# Patient Record
Sex: FEMALE | Race: WHITE | NOT HISPANIC OR LATINO | Employment: OTHER | URBAN - METROPOLITAN AREA
[De-identification: names, ages, dates, MRNs, and addresses within clinical notes are randomized per-mention and may not be internally consistent; named-entity substitution may affect disease eponyms.]

---

## 2019-05-24 DIAGNOSIS — M25.571 RIGHT ANKLE PAIN, UNSPECIFIED CHRONICITY: Primary | ICD-10-CM

## 2019-05-28 ENCOUNTER — HOSPITAL ENCOUNTER (OUTPATIENT)
Dept: RADIOLOGY | Facility: HOSPITAL | Age: 49
Discharge: HOME | End: 2019-05-28
Attending: ORTHOPAEDIC SURGERY
Payer: COMMERCIAL

## 2019-05-28 ENCOUNTER — OFFICE VISIT (OUTPATIENT)
Dept: ORTHOPEDICS | Facility: CLINIC | Age: 49
End: 2019-05-28
Payer: COMMERCIAL

## 2019-05-28 ENCOUNTER — TELEPHONE (OUTPATIENT)
Dept: ORTHOPEDICS | Facility: CLINIC | Age: 49
End: 2019-05-28

## 2019-05-28 VITALS
DIASTOLIC BLOOD PRESSURE: 81 MMHG | SYSTOLIC BLOOD PRESSURE: 129 MMHG | HEIGHT: 66 IN | BODY MASS INDEX: 20.25 KG/M2 | HEART RATE: 65 BPM | WEIGHT: 126 LBS

## 2019-05-28 DIAGNOSIS — M25.571 RIGHT ANKLE PAIN, UNSPECIFIED CHRONICITY: Primary | ICD-10-CM

## 2019-05-28 DIAGNOSIS — M79.671 RIGHT FOOT PAIN: ICD-10-CM

## 2019-05-28 DIAGNOSIS — M25.571 RIGHT ANKLE PAIN, UNSPECIFIED CHRONICITY: ICD-10-CM

## 2019-05-28 DIAGNOSIS — M21.41 ACQUIRED PES PLANOVALGUS OF RIGHT FOOT: ICD-10-CM

## 2019-05-28 PROCEDURE — 99204 OFFICE O/P NEW MOD 45 MIN: CPT | Mod: S$GLB,,, | Performed by: ORTHOPAEDIC SURGERY

## 2019-05-28 PROCEDURE — 3008F BODY MASS INDEX DOCD: CPT | Mod: CPTII,S$GLB,, | Performed by: ORTHOPAEDIC SURGERY

## 2019-05-28 PROCEDURE — 73610 X-RAY EXAM OF ANKLE: CPT | Mod: 26,RT,, | Performed by: RADIOLOGY

## 2019-05-28 PROCEDURE — 99999 PR PBB SHADOW E&M-EST. PATIENT-LVL III: CPT | Mod: PBBFAC,,, | Performed by: ORTHOPAEDIC SURGERY

## 2019-05-28 PROCEDURE — 73610 X-RAY EXAM OF ANKLE: CPT | Mod: TC,PO,RT

## 2019-05-28 PROCEDURE — 3008F PR BODY MASS INDEX (BMI) DOCUMENTED: ICD-10-PCS | Mod: CPTII,S$GLB,, | Performed by: ORTHOPAEDIC SURGERY

## 2019-05-28 PROCEDURE — 99204 PR OFFICE/OUTPT VISIT, NEW, LEVL IV, 45-59 MIN: ICD-10-PCS | Mod: S$GLB,,, | Performed by: ORTHOPAEDIC SURGERY

## 2019-05-28 PROCEDURE — 99999 PR PBB SHADOW E&M-EST. PATIENT-LVL III: ICD-10-PCS | Mod: PBBFAC,,, | Performed by: ORTHOPAEDIC SURGERY

## 2019-05-28 PROCEDURE — 73610 XR ANKLE COMPLETE 3 VIEW RIGHT: ICD-10-PCS | Mod: 26,RT,, | Performed by: RADIOLOGY

## 2019-05-28 RX ORDER — NORGESTREL AND ETHINYL ESTRADIOL 0.3-0.03MG
KIT ORAL
Refills: 1 | COMMUNITY
Start: 2019-04-08 | End: 2023-06-08

## 2019-05-28 RX ORDER — PRAVASTATIN SODIUM 20 MG/1
20 TABLET ORAL NIGHTLY
Refills: 5 | COMMUNITY
Start: 2019-05-15

## 2019-05-28 NOTE — TELEPHONE ENCOUNTER
----- Message from Kaylin Lynch MA sent at 5/28/2019  2:59 PM CDT -----  Contact: pt   Insurance [facility] [place]   Please fax to that office   Call back

## 2019-05-28 NOTE — TELEPHONE ENCOUNTER
----- Message from Xavi Esquivel sent at 5/28/2019  4:42 PM CDT -----  Contact: Patient  [facility] please call again  [facility]

## 2019-05-28 NOTE — PROGRESS NOTES
"HPI: Lisa Simmons is a 48 y.o. female who complains of chronic right ankle pain. She says the pain began 20 years ago when a horse fell on her while she was riding. She wore a cast for 6 weeks at that time.  She still teaches horse back riding. She rates her pain as 3/10 today. She says the pain gets worse every year. She has tried bracing, NSAIDs, and orthotics without relief. The pain is now radiating up the leg.     PAST MEDICAL/SURGICAL/FAMILY/SOCIAL/ HISTORY: REVIEWED    ALLERGIES/MEDICATIONS: REVIEWED       Review of Systems:     Constitution: Negative.   HEENT: Negative.   Eyes: Negative.   Cardiovascular: Negative.   Respiratory: Negative.   Endocrine: Negative.   Hematologic/Lymphatic: Negative.   Skin: Negative.   Musculoskeletal: Positive for right ankle pain   Gastrointestinal: Negative.   Genitourinary: Negative.   Neurological: Negative.   Psychiatric/Behavioral: Negative.   Allergic/Immunologic: Negative.       PHYSICAL EXAM:  Vitals:    05/28/19 1033   BP: 129/81   Pulse: 65     Ht Readings from Last 1 Encounters:   05/28/19 5' 6" (1.676 m)     Wt Readings from Last 1 Encounters:   05/28/19 57.2 kg (126 lb)         GENERAL: Well developed, well nourished, no acute distress. Very pleasant.   SKIN: Skin is intact. No atrophy, abrasions or lesions are noted.   Neurological: Normal mental status. Appropriate and conversant. Alert and oriented x 3.  GAIT: Walks with an -antalgic gait.    Right lower extremity compared with LLE:  2+ dorsalis pedis pulse.  Capillary refill < 3 seconds.  Decreased range of motion tibiotalar and subtalar joints. Severe pes planovalgus and forefoot abductus. THis is still a flexible deformity.  5/5 strength EHL, FHL, tibialis anterior, gastrocsoleus, tibialis posterior and peroneals. Sensation to light touch intact sural, saphenous, superficial peroneal and deep peroneal nerves. No swelling or ecchymosis. No lymphadenopathy, no masses or tumors palpated.   " tenderness to palpation along posterior tibial tendon proximally. She is able to perform single heel raise.       XRAYS:   3 views of right ankle obtained and reviewed today reveal pes planus.       ASSESSMENT:      Right pes planovalgus acquired.       PLAN:  I spent 25 minutes in consulation with the patient today. More than half the time was spent counseling the patient on her condition and the options for operative versus non-operative care.  She has failed conservative treatment. I ordered an MRI of the right hindfoot and midfoot to evaluate for posterior tibial tendon tear and spring ligament tear. F/u post MRI to discuss treatment options.

## 2019-05-28 NOTE — TELEPHONE ENCOUNTER
Lm for patient advising that [facility] is not as great as [facility]. Mri has been approved for [facility]. Patient to call back.

## 2019-05-29 DIAGNOSIS — M79.671 RIGHT FOOT PAIN: Primary | ICD-10-CM

## 2019-05-29 DIAGNOSIS — M21.41 ACQUIRED PES PLANOVALGUS OF RIGHT FOOT: ICD-10-CM

## 2019-06-06 ENCOUNTER — TELEPHONE (OUTPATIENT)
Dept: ORTHOPEDICS | Facility: CLINIC | Age: 49
End: 2019-06-06

## 2019-06-06 NOTE — TELEPHONE ENCOUNTER
----- Message from Xavi Esquivel sent at 6/6/2019 11:30 AM CDT -----  Contact: Brady /   Patient called about Xray, Please call at

## 2019-06-13 ENCOUNTER — OFFICE VISIT (OUTPATIENT)
Dept: ORTHOPEDICS | Facility: CLINIC | Age: 49
End: 2019-06-13
Payer: COMMERCIAL

## 2019-06-13 ENCOUNTER — DOCUMENTATION ONLY (OUTPATIENT)
Dept: ORTHOPEDICS | Facility: CLINIC | Age: 49
End: 2019-06-13

## 2019-06-13 VITALS
DIASTOLIC BLOOD PRESSURE: 72 MMHG | HEART RATE: 55 BPM | HEIGHT: 66 IN | SYSTOLIC BLOOD PRESSURE: 109 MMHG | WEIGHT: 126 LBS | BODY MASS INDEX: 20.25 KG/M2

## 2019-06-13 DIAGNOSIS — M21.41 ACQUIRED PES PLANOVALGUS OF RIGHT FOOT: Primary | ICD-10-CM

## 2019-06-13 PROCEDURE — 3008F PR BODY MASS INDEX (BMI) DOCUMENTED: ICD-10-PCS | Mod: CPTII,S$GLB,, | Performed by: ORTHOPAEDIC SURGERY

## 2019-06-13 PROCEDURE — 99214 OFFICE O/P EST MOD 30 MIN: CPT | Mod: S$GLB,,, | Performed by: ORTHOPAEDIC SURGERY

## 2019-06-13 PROCEDURE — 99999 PR PBB SHADOW E&M-EST. PATIENT-LVL III: ICD-10-PCS | Mod: PBBFAC,,, | Performed by: ORTHOPAEDIC SURGERY

## 2019-06-13 PROCEDURE — 3008F BODY MASS INDEX DOCD: CPT | Mod: CPTII,S$GLB,, | Performed by: ORTHOPAEDIC SURGERY

## 2019-06-13 PROCEDURE — 99999 PR PBB SHADOW E&M-EST. PATIENT-LVL III: CPT | Mod: PBBFAC,,, | Performed by: ORTHOPAEDIC SURGERY

## 2019-06-13 PROCEDURE — 99214 PR OFFICE/OUTPT VISIT, EST, LEVL IV, 30-39 MIN: ICD-10-PCS | Mod: S$GLB,,, | Performed by: ORTHOPAEDIC SURGERY

## 2019-06-13 NOTE — PROGRESS NOTES
"HPI: Lisa Simmons is a 48 y.o. female who is here for f/u on her MRI of her  right ankle and midfoot. She says the pain began 20 years ago when a horse fell on her while she was riding. She wore a cast for 6 weeks at that time.  She still teaches horse back riding. She rates her pain as 4/10 today. She says the pain gets worse every year. She has tried bracing, NSAIDs, and orthotics without relief. The pain is now radiating up the leg. The pain has been worsening over time.     PAST MEDICAL/SURGICAL/FAMILY/SOCIAL/ HISTORY: REVIEWED    ALLERGIES/MEDICATIONS: REVIEWED       Review of Systems:     Constitution: Negative.   HEENT: Negative.   Eyes: Negative.   Cardiovascular: Negative.   Respiratory: Negative.   Endocrine: Negative.   Hematologic/Lymphatic: Negative.   Skin: Negative.   Musculoskeletal: Positive for right ankle pain   Gastrointestinal: Negative.   Genitourinary: Negative.   Neurological: Negative.   Psychiatric/Behavioral: Negative.   Allergic/Immunologic: Negative.       PHYSICAL EXAM:  Vitals:    06/13/19 0942   BP: 109/72   Pulse: (!) 55     Ht Readings from Last 1 Encounters:   06/13/19 5' 6" (1.676 m)     Wt Readings from Last 1 Encounters:   06/13/19 57.2 kg (126 lb)         GENERAL: Well developed, well nourished, no acute distress. Very pleasant.   SKIN: Skin is intact. No atrophy, abrasions or lesions are noted.   Neurological: Normal mental status. Appropriate and conversant. Alert and oriented x 3.  GAIT: Walks with an -antalgic gait.    Right lower extremity compared with LLE:  2+ dorsalis pedis pulse.  Capillary refill < 3 seconds.  Decreased range of motion tibiotalar and subtalar joints. Severe pes planovalgus and forefoot abductus. THis is still a flexible deformity.  5/5 strength EHL, FHL, tibialis anterior, gastrocsoleus, tibialis posterior and peroneals. Sensation to light touch intact sural, saphenous, superficial peroneal and deep peroneal nerves. No swelling or " ecchymosis. No lymphadenopathy, no masses or tumors palpated.   tenderness to palpation along posterior tibial tendon proximally. She is able to perform single heel raise.   non-tender to palpation at the 1st tmtj.       XRAYS:   3 views of right ankle obtained and reviewed today reveal pes planus.       ASSESSMENT:      Right pes planovalgus acquired.       PLAN:  I spent 15 minutes in consulation with the patient again today. I reviewed her MRI of the right hindfoot and midfoot which showed no evidence of  posterior tibial tendon tear or spring ligament tear. She has now failed conservative management with nsaids, activity modification, PT, orthotics, and bracing.  We discussed flat foot reconstruction with gastroc recession, amezquita, cotton and medial sliding calcaneal osteotomy. We discussed surgical treatment for the patient's condition at length and in detail including post-operative course, complications, outcomes and prognosis using diagrams and models where appropriate. Informed consent was obtained, patient understands risks and benefits of procedure.  To OR 6/26/19.

## 2019-06-13 NOTE — PROGRESS NOTES
Instructed patient on preop instructions. Patient to stop taking any blood thinning medications at least seven days prior to scheduled surgery. Patient to not take any NSAIDS at least 7 days prior to surgery and will resume when Dr. Guy instructs them to as it can delay bone healing. Nothing to eat/drink after midnight the night prior to surgery.   Patient instructed on postop care. Instructions included to remain non weight bearing until instructed otherwise by Dr. Guy. Post op dressing can not get wet. If it gets wet patient to call office immediately or go to [facility] ER to have it replaced. Patient to keep affected limb elevated higher than the heart at all times after surgery to decrease swelling. Further instructions given to patient on preop/postop instruction handout. No NSAID form given to patient. Patient choose knee scooter to be ordered. Instructed we will attempt to have it authorized through insurance. Handout on knee scooter given to patient as well. Surgery date of 6/26/19. Patient verbalized understanding.

## 2019-06-14 ENCOUNTER — TELEPHONE (OUTPATIENT)
Dept: ORTHOPEDICS | Facility: CLINIC | Age: 49
End: 2019-06-14

## 2019-06-14 DIAGNOSIS — M21.41 ACQUIRED PES PLANOVALGUS OF RIGHT FOOT: Primary | ICD-10-CM

## 2019-06-14 RX ORDER — SODIUM CHLORIDE 9 MG/ML
INJECTION, SOLUTION INTRAVENOUS CONTINUOUS
Status: CANCELLED | OUTPATIENT
Start: 2019-06-14

## 2019-06-14 NOTE — TELEPHONE ENCOUNTER
----- Message from Kaylin Lynch MA sent at 6/14/2019  9:28 AM CDT -----  Contact: binu phan on Right leg, schamburgs disease   Call back      Up coming surgery scheduled

## 2019-06-25 ENCOUNTER — ANESTHESIA EVENT (OUTPATIENT)
Dept: SURGERY | Facility: HOSPITAL | Age: 49
End: 2019-06-25
Payer: COMMERCIAL

## 2019-06-25 DIAGNOSIS — M21.41 ACQUIRED PES PLANOVALGUS OF RIGHT FOOT: ICD-10-CM

## 2019-06-25 DIAGNOSIS — M79.671 RIGHT FOOT PAIN: Primary | ICD-10-CM

## 2019-06-25 PROCEDURE — 99024 POSTOP FOLLOW-UP VISIT: CPT | Mod: ,,, | Performed by: ORTHOPAEDIC SURGERY

## 2019-06-25 PROCEDURE — 99024 SUTURE REMOVAL: ICD-10-PCS | Mod: ,,, | Performed by: ORTHOPAEDIC SURGERY

## 2019-06-26 ENCOUNTER — ANESTHESIA (OUTPATIENT)
Dept: SURGERY | Facility: HOSPITAL | Age: 49
End: 2019-06-26
Payer: COMMERCIAL

## 2019-06-26 ENCOUNTER — HOSPITAL ENCOUNTER (OUTPATIENT)
Dept: RADIOLOGY | Facility: HOSPITAL | Age: 49
Discharge: HOME | End: 2019-06-26
Attending: ORTHOPAEDIC SURGERY | Admitting: ORTHOPAEDIC SURGERY
Payer: COMMERCIAL

## 2019-06-26 ENCOUNTER — HOSPITAL ENCOUNTER (OUTPATIENT)
Facility: HOSPITAL | Age: 49
Discharge: HOME | End: 2019-06-26
Attending: ORTHOPAEDIC SURGERY | Admitting: ORTHOPAEDIC SURGERY
Payer: COMMERCIAL

## 2019-06-26 VITALS
BODY MASS INDEX: 20.89 KG/M2 | HEART RATE: 70 BPM | WEIGHT: 130 LBS | DIASTOLIC BLOOD PRESSURE: 72 MMHG | RESPIRATION RATE: 16 BRPM | HEIGHT: 66 IN | SYSTOLIC BLOOD PRESSURE: 124 MMHG | OXYGEN SATURATION: 100 % | TEMPERATURE: 97 F

## 2019-06-26 DIAGNOSIS — M79.671 RIGHT FOOT PAIN: ICD-10-CM

## 2019-06-26 DIAGNOSIS — M21.41 ACQUIRED PES PLANOVALGUS OF RIGHT FOOT: Primary | ICD-10-CM

## 2019-06-26 LAB
B-HCG UR QL: NEGATIVE
CTP QC/QA: YES

## 2019-06-26 PROCEDURE — 27200750 HC INSULATED NEEDLE/ STIMUPLEX: Mod: PO | Performed by: ANESTHESIOLOGY

## 2019-06-26 PROCEDURE — 28304 INCISION OF MIDFOOT BONES: CPT | Mod: 51,RT,, | Performed by: ORTHOPAEDIC SURGERY

## 2019-06-26 PROCEDURE — 71000015 HC POSTOP RECOV 1ST HR: Mod: PO | Performed by: ORTHOPAEDIC SURGERY

## 2019-06-26 PROCEDURE — 71000033 HC RECOVERY, INTIAL HOUR: Mod: PO | Performed by: ORTHOPAEDIC SURGERY

## 2019-06-26 PROCEDURE — 76000 FLUOROSCOPY <1 HR PHYS/QHP: CPT | Mod: TC,PO

## 2019-06-26 PROCEDURE — 27687 REVISION OF CALF TENDON: CPT | Mod: 51,RT,, | Performed by: ORTHOPAEDIC SURGERY

## 2019-06-26 PROCEDURE — 76942 ECHO GUIDE FOR BIOPSY: CPT | Mod: 26,,, | Performed by: ANESTHESIOLOGY

## 2019-06-26 PROCEDURE — 64450 NJX AA&/STRD OTHER PN/BRANCH: CPT | Mod: 59,RT,, | Performed by: ANESTHESIOLOGY

## 2019-06-26 PROCEDURE — 64445 NJX AA&/STRD SCIATIC NRV IMG: CPT | Mod: PO | Performed by: ANESTHESIOLOGY

## 2019-06-26 PROCEDURE — 27800903 OPTIME MED/SURG SUP & DEVICES OTHER IMPLANTS: Mod: PO | Performed by: ORTHOPAEDIC SURGERY

## 2019-06-26 PROCEDURE — 63600175 PHARM REV CODE 636 W HCPCS: Mod: PO | Performed by: NURSE ANESTHETIST, CERTIFIED REGISTERED

## 2019-06-26 PROCEDURE — 81025 URINE PREGNANCY TEST: CPT | Mod: PO | Performed by: ORTHOPAEDIC SURGERY

## 2019-06-26 PROCEDURE — 63600175 PHARM REV CODE 636 W HCPCS: Mod: PO | Performed by: ANESTHESIOLOGY

## 2019-06-26 PROCEDURE — D9220A PRA ANESTHESIA: ICD-10-PCS | Mod: CRNA,,, | Performed by: NURSE ANESTHETIST, CERTIFIED REGISTERED

## 2019-06-26 PROCEDURE — 25000003 PHARM REV CODE 250: Mod: PO | Performed by: ANESTHESIOLOGY

## 2019-06-26 PROCEDURE — 36000709 HC OR TIME LEV III EA ADD 15 MIN: Mod: PO | Performed by: ORTHOPAEDIC SURGERY

## 2019-06-26 PROCEDURE — 27201423 OPTIME MED/SURG SUP & DEVICES STERILE SUPPLY: Mod: PO | Performed by: ORTHOPAEDIC SURGERY

## 2019-06-26 PROCEDURE — 28300 INCISION OF HEEL BONE: CPT | Mod: RT,,, | Performed by: ORTHOPAEDIC SURGERY

## 2019-06-26 PROCEDURE — 64450 PR NERVE BLOCK INJ, ANES/STEROID, OTHER PERIPHERAL: ICD-10-PCS | Mod: 59,RT,, | Performed by: ANESTHESIOLOGY

## 2019-06-26 PROCEDURE — 25000003 PHARM REV CODE 250: Mod: PO | Performed by: ORTHOPAEDIC SURGERY

## 2019-06-26 PROCEDURE — C1713 ANCHOR/SCREW BN/BN,TIS/BN: HCPCS | Mod: PO | Performed by: ORTHOPAEDIC SURGERY

## 2019-06-26 PROCEDURE — 27687 PR GASTROCNEMIUS RECESSION: ICD-10-PCS | Mod: 51,RT,, | Performed by: ORTHOPAEDIC SURGERY

## 2019-06-26 PROCEDURE — S0020 INJECTION, BUPIVICAINE HYDRO: HCPCS | Mod: PO | Performed by: ANESTHESIOLOGY

## 2019-06-26 PROCEDURE — 63600175 PHARM REV CODE 636 W HCPCS: Mod: PO | Performed by: ORTHOPAEDIC SURGERY

## 2019-06-26 PROCEDURE — D9220A PRA ANESTHESIA: ICD-10-PCS | Mod: ANES,,, | Performed by: ANESTHESIOLOGY

## 2019-06-26 PROCEDURE — 28304 PR OSTEOTOMY MIDTARSAL BONES: ICD-10-PCS | Mod: 51,RT,, | Performed by: ORTHOPAEDIC SURGERY

## 2019-06-26 PROCEDURE — 25000003 PHARM REV CODE 250: Mod: PO | Performed by: NURSE ANESTHETIST, CERTIFIED REGISTERED

## 2019-06-26 PROCEDURE — 76942 PR U/S GUIDANCE FOR NEEDLE GUIDANCE: ICD-10-PCS | Mod: 26,,, | Performed by: ANESTHESIOLOGY

## 2019-06-26 PROCEDURE — C1769 GUIDE WIRE: HCPCS | Mod: PO | Performed by: ORTHOPAEDIC SURGERY

## 2019-06-26 PROCEDURE — 76942 ECHO GUIDE FOR BIOPSY: CPT | Mod: PO | Performed by: ANESTHESIOLOGY

## 2019-06-26 PROCEDURE — C9290 INJ, BUPIVACAINE LIPOSOME: HCPCS | Mod: PO | Performed by: ANESTHESIOLOGY

## 2019-06-26 PROCEDURE — 28300 PR OSTEOTOMY HEEL BONE: ICD-10-PCS | Mod: RT,,, | Performed by: ORTHOPAEDIC SURGERY

## 2019-06-26 PROCEDURE — D9220A PRA ANESTHESIA: Mod: CRNA,,, | Performed by: NURSE ANESTHETIST, CERTIFIED REGISTERED

## 2019-06-26 PROCEDURE — 36000708 HC OR TIME LEV III 1ST 15 MIN: Mod: PO | Performed by: ORTHOPAEDIC SURGERY

## 2019-06-26 PROCEDURE — D9220A PRA ANESTHESIA: Mod: ANES,,, | Performed by: ANESTHESIOLOGY

## 2019-06-26 PROCEDURE — 37000009 HC ANESTHESIA EA ADD 15 MINS: Mod: PO | Performed by: ORTHOPAEDIC SURGERY

## 2019-06-26 PROCEDURE — 37000008 HC ANESTHESIA 1ST 15 MINUTES: Mod: PO | Performed by: ORTHOPAEDIC SURGERY

## 2019-06-26 DEVICE — IMPLANTABLE DEVICE: Type: IMPLANTABLE DEVICE | Site: FOOT | Status: FUNCTIONAL

## 2019-06-26 RX ORDER — GLYCOPYRROLATE 0.2 MG/ML
INJECTION INTRAMUSCULAR; INTRAVENOUS
Status: DISCONTINUED | OUTPATIENT
Start: 2019-06-26 | End: 2019-06-26

## 2019-06-26 RX ORDER — ONDANSETRON 2 MG/ML
INJECTION INTRAMUSCULAR; INTRAVENOUS
Status: DISCONTINUED | OUTPATIENT
Start: 2019-06-26 | End: 2019-06-26

## 2019-06-26 RX ORDER — MAGNESIUM 30 MG
TABLET ORAL ONCE
COMMUNITY

## 2019-06-26 RX ORDER — MIDAZOLAM HYDROCHLORIDE 1 MG/ML
INJECTION, SOLUTION INTRAMUSCULAR; INTRAVENOUS
Status: DISCONTINUED | OUTPATIENT
Start: 2019-06-26 | End: 2019-06-26

## 2019-06-26 RX ORDER — PROPOFOL 10 MG/ML
VIAL (ML) INTRAVENOUS
Status: DISCONTINUED | OUTPATIENT
Start: 2019-06-26 | End: 2019-06-26

## 2019-06-26 RX ORDER — BUPIVACAINE HYDROCHLORIDE 5 MG/ML
INJECTION, SOLUTION EPIDURAL; INTRACAUDAL
Status: COMPLETED | OUTPATIENT
Start: 2019-06-26 | End: 2019-06-26

## 2019-06-26 RX ORDER — MUPIROCIN 20 MG/G
OINTMENT TOPICAL
Status: DISCONTINUED | OUTPATIENT
Start: 2019-06-26 | End: 2019-06-26 | Stop reason: HOSPADM

## 2019-06-26 RX ORDER — FENTANYL CITRATE 50 UG/ML
INJECTION, SOLUTION INTRAMUSCULAR; INTRAVENOUS
Status: DISCONTINUED | OUTPATIENT
Start: 2019-06-26 | End: 2019-06-26

## 2019-06-26 RX ORDER — LIDOCAINE HCL/PF 100 MG/5ML
SYRINGE (ML) INTRAVENOUS
Status: DISCONTINUED | OUTPATIENT
Start: 2019-06-26 | End: 2019-06-26

## 2019-06-26 RX ORDER — MULTIVIT WITH MINERALS/HERBS
1 TABLET ORAL DAILY
COMMUNITY

## 2019-06-26 RX ORDER — DEXAMETHASONE SODIUM PHOSPHATE 4 MG/ML
8 INJECTION, SOLUTION INTRA-ARTICULAR; INTRALESIONAL; INTRAMUSCULAR; INTRAVENOUS; SOFT TISSUE
Status: COMPLETED | OUTPATIENT
Start: 2019-06-26 | End: 2019-06-26

## 2019-06-26 RX ORDER — SCOLOPAMINE TRANSDERMAL SYSTEM 1 MG/1
1 PATCH, EXTENDED RELEASE TRANSDERMAL
Status: DISCONTINUED | OUTPATIENT
Start: 2019-06-26 | End: 2019-06-26 | Stop reason: HOSPADM

## 2019-06-26 RX ORDER — OXYCODONE AND ACETAMINOPHEN 10; 325 MG/1; MG/1
1 TABLET ORAL EVERY 4 HOURS PRN
Qty: 22 TABLET | Refills: 0 | Status: SHIPPED | OUTPATIENT
Start: 2019-06-26 | End: 2019-06-27

## 2019-06-26 RX ORDER — MIDAZOLAM HYDROCHLORIDE 1 MG/ML
0.5 INJECTION INTRAMUSCULAR; INTRAVENOUS
Status: DISCONTINUED | OUTPATIENT
Start: 2019-06-26 | End: 2019-06-26 | Stop reason: HOSPADM

## 2019-06-26 RX ORDER — EPHEDRINE SULFATE 50 MG/ML
INJECTION, SOLUTION INTRAVENOUS
Status: DISCONTINUED | OUTPATIENT
Start: 2019-06-26 | End: 2019-06-26

## 2019-06-26 RX ORDER — LIDOCAINE HYDROCHLORIDE 10 MG/ML
1 INJECTION, SOLUTION EPIDURAL; INFILTRATION; INTRACAUDAL; PERINEURAL ONCE
Status: DISCONTINUED | OUTPATIENT
Start: 2019-06-26 | End: 2019-06-26 | Stop reason: HOSPADM

## 2019-06-26 RX ORDER — OXYCODONE HYDROCHLORIDE 5 MG/1
5 TABLET ORAL
Status: DISCONTINUED | OUTPATIENT
Start: 2019-06-26 | End: 2019-06-26 | Stop reason: HOSPADM

## 2019-06-26 RX ORDER — NEOSTIGMINE METHYLSULFATE 1 MG/ML
INJECTION, SOLUTION INTRAVENOUS
Status: DISCONTINUED | OUTPATIENT
Start: 2019-06-26 | End: 2019-06-26

## 2019-06-26 RX ORDER — ROCURONIUM BROMIDE 10 MG/ML
INJECTION, SOLUTION INTRAVENOUS
Status: DISCONTINUED | OUTPATIENT
Start: 2019-06-26 | End: 2019-06-26

## 2019-06-26 RX ORDER — SODIUM CHLORIDE, SODIUM LACTATE, POTASSIUM CHLORIDE, CALCIUM CHLORIDE 600; 310; 30; 20 MG/100ML; MG/100ML; MG/100ML; MG/100ML
INJECTION, SOLUTION INTRAVENOUS CONTINUOUS
Status: DISCONTINUED | OUTPATIENT
Start: 2019-06-26 | End: 2019-06-26 | Stop reason: HOSPADM

## 2019-06-26 RX ORDER — ZINC GLUCONATE 50 MG
50 TABLET ORAL DAILY
COMMUNITY

## 2019-06-26 RX ORDER — ASCORBIC ACID 250 MG
TABLET,CHEWABLE ORAL
COMMUNITY

## 2019-06-26 RX ORDER — CEFAZOLIN SODIUM 2 G/50ML
2 SOLUTION INTRAVENOUS
Status: COMPLETED | OUTPATIENT
Start: 2019-06-26 | End: 2019-06-26

## 2019-06-26 RX ORDER — FENTANYL CITRATE 50 UG/ML
25 INJECTION, SOLUTION INTRAMUSCULAR; INTRAVENOUS EVERY 5 MIN PRN
Status: DISCONTINUED | OUTPATIENT
Start: 2019-06-26 | End: 2019-06-26 | Stop reason: HOSPADM

## 2019-06-26 RX ORDER — HYDROMORPHONE HYDROCHLORIDE 2 MG/ML
0.2 INJECTION, SOLUTION INTRAMUSCULAR; INTRAVENOUS; SUBCUTANEOUS EVERY 5 MIN PRN
Status: DISCONTINUED | OUTPATIENT
Start: 2019-06-26 | End: 2019-06-26 | Stop reason: HOSPADM

## 2019-06-26 RX ORDER — ONDANSETRON 4 MG/1
8 TABLET, ORALLY DISINTEGRATING ORAL EVERY 8 HOURS PRN
Qty: 12 TABLET | Refills: 1 | Status: SHIPPED | OUTPATIENT
Start: 2019-06-26 | End: 2023-06-08

## 2019-06-26 RX ORDER — FERROUS SULFATE 325(65) MG
325 TABLET ORAL
COMMUNITY

## 2019-06-26 RX ORDER — SODIUM CHLORIDE 9 MG/ML
INJECTION, SOLUTION INTRAVENOUS CONTINUOUS
Status: DISCONTINUED | OUTPATIENT
Start: 2019-06-26 | End: 2019-06-26 | Stop reason: HOSPADM

## 2019-06-26 RX ADMIN — NEOSTIGMINE METHYLSULFATE 3 MG: 1 INJECTION INTRAVENOUS at 10:06

## 2019-06-26 RX ADMIN — LIDOCAINE HYDROCHLORIDE 100 MG: 20 INJECTION PARENTERAL at 09:06

## 2019-06-26 RX ADMIN — CEFAZOLIN SODIUM 2 G: 2 SOLUTION INTRAVENOUS at 09:06

## 2019-06-26 RX ADMIN — GLYCOPYRROLATE 0.4 MG: 0.2 INJECTION, SOLUTION INTRAMUSCULAR; INTRAVENOUS at 10:06

## 2019-06-26 RX ADMIN — MIDAZOLAM HYDROCHLORIDE 2 MG: 1 INJECTION, SOLUTION INTRAMUSCULAR; INTRAVENOUS at 09:06

## 2019-06-26 RX ADMIN — ROCURONIUM BROMIDE 30 MG: 10 INJECTION, SOLUTION INTRAVENOUS at 09:06

## 2019-06-26 RX ADMIN — SCOPALAMINE 1 PATCH: 1 PATCH, EXTENDED RELEASE TRANSDERMAL at 08:06

## 2019-06-26 RX ADMIN — BUPIVACAINE HYDROCHLORIDE 10 ML: 5 INJECTION, SOLUTION EPIDURAL; INTRACAUDAL; PERINEURAL at 09:06

## 2019-06-26 RX ADMIN — GLYCOPYRROLATE 0.2 MG: 0.2 INJECTION, SOLUTION INTRAMUSCULAR; INTRAVENOUS at 10:06

## 2019-06-26 RX ADMIN — SODIUM CHLORIDE, SODIUM LACTATE, POTASSIUM CHLORIDE, AND CALCIUM CHLORIDE: .6; .31; .03; .02 INJECTION, SOLUTION INTRAVENOUS at 10:06

## 2019-06-26 RX ADMIN — PROPOFOL 200 MG: 10 INJECTION, EMULSION INTRAVENOUS at 09:06

## 2019-06-26 RX ADMIN — FENTANYL CITRATE 50 MCG: 50 INJECTION INTRAMUSCULAR; INTRAVENOUS at 09:06

## 2019-06-26 RX ADMIN — DEXAMETHASONE SODIUM PHOSPHATE 8 MG: 4 INJECTION, SOLUTION INTRAMUSCULAR; INTRAVENOUS at 08:06

## 2019-06-26 RX ADMIN — FENTANYL CITRATE 50 MCG: 50 INJECTION, SOLUTION INTRAMUSCULAR; INTRAVENOUS at 09:06

## 2019-06-26 RX ADMIN — ONDANSETRON 4 MG: 2 INJECTION, SOLUTION INTRAMUSCULAR; INTRAVENOUS at 10:06

## 2019-06-26 RX ADMIN — FENTANYL CITRATE 50 MCG: 50 INJECTION, SOLUTION INTRAMUSCULAR; INTRAVENOUS at 10:06

## 2019-06-26 RX ADMIN — SODIUM CHLORIDE, SODIUM LACTATE, POTASSIUM CHLORIDE, AND CALCIUM CHLORIDE: .6; .31; .03; .02 INJECTION, SOLUTION INTRAVENOUS at 08:06

## 2019-06-26 RX ADMIN — BUPIVACAINE 20 ML: 13.3 INJECTION, SUSPENSION, LIPOSOMAL INFILTRATION at 09:06

## 2019-06-26 RX ADMIN — EPHEDRINE SULFATE 10 MG: 50 INJECTION, SOLUTION INTRAMUSCULAR; INTRAVENOUS; SUBCUTANEOUS at 10:06

## 2019-06-26 NOTE — ANESTHESIA PROCEDURE NOTES
Peripheral Block    Patient location during procedure: pre-op   Block not for primary anesthetic.  Reason for block: at surgeon's request and post-op pain management   Post-op Pain Location: right ankle  Start time: 6/26/2019 9:04 AM  Timeout: 6/26/2019 9:03 AM   End time: 6/26/2019 9:13 AM  Staffing  Other anesthesia staff: Raquel Lara MD  Preanesthetic Checklist  Completed: patient identified, site marked, surgical consent, pre-op evaluation, timeout performed, IV checked, risks and benefits discussed and monitors and equipment checked  Peripheral Block  Patient position: supine  Prep: ChloraPrep  Patient monitoring: heart rate, cardiac monitor, continuous pulse ox, continuous capnometry and frequent blood pressure checks  Block type: popliteal  Laterality: right  Injection technique: single shot  Needle  Needle type: Stimuplex   Needle gauge: 21 G  Needle length: 4 in  Needle localization: anatomical landmarks and ultrasound guidance   -ultrasound image captured on disc.  Assessment  Injection assessment: negative aspiration, negative parasthesia and local visualized surrounding nerve  Paresthesia pain: none  Heart rate change: no  Slow fractionated injection: yes  Additional Notes  VSS.  [facility] RN monitoring vitals throughout procedure.  Patient tolerated procedure well.     Exparel 20mL

## 2019-06-26 NOTE — ANESTHESIA PREPROCEDURE EVALUATION
06/26/2019  Lisa Simmons is a 48 y.o., female.    Anesthesia Evaluation    I have reviewed the Patient Summary Reports.    I have reviewed the Nursing Notes.      Review of Systems  Anesthesia Hx:  No problems with previous Anesthesia    Cardiovascular:  Cardiovascular Normal     Pulmonary:  Pulmonary Normal        Physical Exam  General:  Well nourished    Airway/Jaw/Neck:  Airway Findings: Mouth Opening: Normal Tongue: Normal  Mallampati: I  TM Distance: Normal, at least 6 cm  Jaw/Neck Findings:  Neck ROM: Normal ROM     Eyes/Ears/Nose:  Eyes/Ears/Nose Findings:    Dental:  Dental Findings: In tact   Chest/Lungs:  Chest/Lungs Findings: Normal Respiratory Rate     Heart/Vascular:  Heart Findings: Rate: Normal  Rhythm: Regular Rhythm        Mental Status:  Mental Status Findings:  Cooperative, Alert and Oriented         Anesthesia Plan  Type of Anesthesia, risks & benefits discussed:  Anesthesia Type:  general  Patient's Preference: General  Intra-op Monitoring Plan: standard ASA monitors  Intra-op Monitoring Plan Comments:   Post Op Pain Control Plan: multimodal analgesia, peripheral nerve block, IV/PO Opioids PRN and per primary service following discharge from PACU  Post Op Pain Control Plan Comments:   Induction:   IV  Beta Blocker:  Patient is not currently on a Beta-Blocker (No further documentation required).       Informed Consent: Patient understands risks and agrees with Anesthesia plan.  Questions answered. Anesthesia consent signed with patient.  ASA Score: 1     Day of Surgery Review of History & Physical:    H&P update referred to the surgeon.         Ready For Surgery From Anesthesia Perspective.

## 2019-06-26 NOTE — H&P
"HPI: Lisa Simmons is a 48 y.o. female who is here for f/u on her MRI of her  right ankle and midfoot. She says the pain began 20 years ago when a horse fell on her while she was riding. She wore a cast for 6 weeks at that time.  She still teaches horse back riding. She rates her pain as 4/10 today. She says the pain gets worse every year. She has tried bracing, NSAIDs, and orthotics without relief. The pain is now radiating up the leg. The pain has been worsening over time.      PAST MEDICAL/SURGICAL/FAMILY/SOCIAL/ HISTORY: REVIEWED    ALLERGIES/MEDICATIONS: REVIEWED         Review of Systems:     Constitution: Negative.   HEENT: Negative.   Eyes: Negative.   Cardiovascular: Negative.   Respiratory: Negative.   Endocrine: Negative.   Hematologic/Lymphatic: Negative.   Skin: Negative.   Musculoskeletal: Positive for right ankle pain   Gastrointestinal: Negative.   Genitourinary: Negative.   Neurological: Negative.   Psychiatric/Behavioral: Negative.   Allergic/Immunologic: Negative.         PHYSICAL EXAM:      Vitals:     06/13/19 0942   BP: 109/72   Pulse: (!) 55          Ht Readings from Last 1 Encounters:   06/13/19 5' 6" (1.676 m)          Wt Readings from Last 1 Encounters:   06/13/19 57.2 kg (126 lb)            GENERAL: Well developed, well nourished, no acute distress. Very pleasant.   SKIN: Skin is intact. No atrophy, abrasions or lesions are noted.   Neurological: Normal mental status. Appropriate and conversant. Alert and oriented x 3.  GAIT: Walks with an -antalgic gait.     Right lower extremity compared with LLE:  2+ dorsalis pedis pulse.  Capillary refill < 3 seconds.  Decreased range of motion tibiotalar and subtalar joints. Severe pes planovalgus and forefoot abductus. THis is still a flexible deformity.  5/5 strength EHL, FHL, tibialis anterior, gastrocsoleus, tibialis posterior and peroneals. Sensation to light touch intact sural, saphenous, superficial peroneal and deep peroneal " nerves. No swelling or ecchymosis. No lymphadenopathy, no masses or tumors palpated.   tenderness to palpation along posterior tibial tendon proximally. She is able to perform single heel raise.   non-tender to palpation at the 1st tmtj.         XRAYS:   3 views of right ankle obtained and reviewed today reveal pes planus.         ASSESSMENT:       Right pes planovalgus acquired.        PLAN:  I spent 15 minutes in consulation with the patient again today. I reviewed her MRI of the right hindfoot and midfoot which showed no evidence of  posterior tibial tendon tear or spring ligament tear. She has now failed conservative management with nsaids, activity modification, PT, orthotics, and bracing.  We discussed flat foot reconstruction with gastroc recession, amezquita, cotton and medial sliding calcaneal osteotomy. We discussed surgical treatment for the patient's condition at length and in detail including post-operative course, complications, outcomes and prognosis using diagrams and models where appropriate. Informed consent was obtained, patient understands risks and benefits of procedure.  To OR 6/26/19.

## 2019-06-26 NOTE — ANESTHESIA POSTPROCEDURE EVALUATION
Anesthesia Post Evaluation    Patient: Lisa Simmons    Procedure(s) Performed: Procedure(s) (LRB):  RESECTION, MUSCLE, GASTROCNEMIUS (Right)  OSTEOTOMY, CALCANEUS (Right)  OSTEOTOMY, FOOT (Right)  OSTEOTOMY, TARSAL BONE (Right)    Final Anesthesia Type: general  Patient location during evaluation: PACU  Patient participation: Yes- Able to Participate  Level of consciousness: awake and alert, oriented and awake  Post-procedure vital signs: reviewed and stable  Pain management: adequate  Airway patency: patent  PONV status at discharge: No PONV  Anesthetic complications: no      Cardiovascular status: blood pressure returned to baseline and hemodynamically stable  Respiratory status: unassisted, spontaneous ventilation and room air  Hydration status: euvolemic  Follow-up not needed.          Vitals Value Taken Time   /59 6/26/2019 11:23 AM   Temp 36.2 °C (97.2 °F) 6/26/2019 11:23 AM   Pulse 94 6/26/2019 11:23 AM   Resp 16 6/26/2019 11:23 AM   SpO2 100 % 6/26/2019 11:23 AM         No case tracking events are documented in the log.      Pain/Jus Score: Pain Rating Prior to Med Admin: 0 (6/26/2019  9:08 AM)  Jus Score: 6 (6/26/2019 11:23 AM)

## 2019-06-26 NOTE — DISCHARGE INSTRUCTIONS
Flat Foot Reconstruction  Post Operative Instructions    Cast:     You will have a splint on the leg following surgery    The splint will remain in place for  2 weeks    You will be non-weightbearing in the splint and cast for 6-8 weeks    Wound:    The surgical incision has been closed with sutures    Do not get the dressing/splint wet and do not remove the dressing/splint for 2 weeks. When showering place a bag over the dressing and secure with tape or rubberband to your leg to avoid the cast and wound getting wet and still put the leg out of the shower      Do not remove the dressing until your 2 week appointment - the first dressing change will occur at your 2 week appointment    Stitches will be removed at your 2 week appointment if the incision is healed    A cast will be applied at your 2 week visit    Once the cast is removed at 6-8 weeks you will begin Weight bearing as tolerated in a CAM Boot    You can shower and wash the foot once you are in the boot    You do not have to sleep in the boot    Do not immerse the foot in water (bath, hot tub, pool, lake, pond, river, ocean) for 4 weeks    You will apply scar cream and pain cream together to the incisions once in the boot    Weight Bearing:    You will be non-weight bearing for the first two weeks. You will be given crutches and knee scooter or wheelchair     You will be non-weightbearing for 6-8 weeks depending on how your bones are healing.     After 6-8 weeks you may begin fully walking on the foot if the bones are healed    Medications:    You will be given a prescription for pain medication     Pain medication should be used regularly for the first 24-48 hours, when required for the first 1 to 2 weeks, followed by Regular Tylenol     Driving:     For right foot surgery you are not permitted to drive for 8-10 weeks    For left foot surgery, please contact your insurance company to see if you are permitted to drive    Driving is not permitted while on  narcotics    Work:    Two weeks off work is recommended for initial recovery    If you are able to get to work safely, and will be seated with the foot elevated for the majority of the day, you may return to work a couple days after surgery. This is assuming you are not taking narcotic pain mediation.    From 2-6 weeks sedentary duties is recommended    By 10-12 weeks you can slowly return to your normal duties    If your job is physically demanding, return to full duties is usually possible around 12 weeks post operatively    Follow Up:    You will have your first appointment 2 weeks after surgery in the Clinic    You will have follow up appointments every 2 weeks until you get into the boot    Recovery:    It is normal to experience mild to moderate pain, numbness, or tingling for the first 2 weeks following surgery    Please come to the emergency department if you are suffering from severe pain    You will get back to most of your activities by 3-6 months    Swelling often remains for 6-12 months    You are expected to experience a maximal improvement from surgery in 9-12 months    Physiotherapy:    Formal physiotherapy is necessary and will usually be twice a week for 6-8 weeks      Discharge Instructions: After Your Surgery  Youve just had surgery. During surgery, you were given medicine called anesthesia to keep you relaxed and free of pain. After surgery, you may have some pain or nausea. This is common. Here are some tips for feeling better and getting well after surgery.     Stay on schedule with your medicine.   Going home  Your healthcare provider will show you how to take care of yourself when you go home. He or she will also answer your questions. Have an adult family member or friend drive you home. For the first 24 hours after your surgery:  · Do not drive or use heavy equipment.  · Do not make important decisions or sign legal papers.  · Do not drink alcohol.  · Have someone stay with you, if  needed. He or she can watch for problems and help keep you safe.  Be sure to go to all follow-up visits with your healthcare provider. And rest after your surgery for as long as your healthcare provider tells you to.  Coping with pain  If you have pain after surgery, pain medicine will help you feel better. Take it as told, before pain becomes severe. Also, ask your healthcare provider or pharmacist about other ways to control pain. This might be with heat, ice, or relaxation. And follow any other instructions your surgeon or nurse gives you.  Tips for taking pain medicine  To get the best relief possible, remember these points:  · Pain medicines can upset your stomach. Taking them with a little food may help.  · Most pain relievers taken by mouth need at least 20 to 30 minutes to start to work.  · Taking medicine on a schedule can help you remember to take it. Try to time your medicine so that you can take it before starting an activity. This might be before you get dressed, go for a walk, or sit down for dinner.  · Constipation is a common side effect of pain medicines. Call your healthcare provider before taking any medicines such as laxatives or stool softeners to help ease constipation. Also ask if you should skip any foods. Drinking lots of fluids and eating foods such as fruits and vegetables that are high in fiber can also help. Remember, do not take laxatives unless your surgeon has prescribed them.  · Drinking alcohol and taking pain medicine can cause dizziness and slow your breathing. It can even be deadly. Do not drink alcohol while taking pain medicine.  · Pain medicine can make you react more slowly to things. Do not drive or run machinery while taking pain medicine.  Your healthcare provider may tell you to take acetaminophen to help ease your pain. Ask him or her how much you are supposed to take each day. Acetaminophen or other pain relievers may interact with your prescription medicines or other  over-the-counter (OTC) medicines. Some prescription medicines have acetaminophen and other ingredients. Using both prescription and OTC acetaminophen for pain can cause you to overdose. Read the labels on your OTC medicines with care. This will help you to clearly know the list of ingredients, how much to take, and any warnings. It may also help you not take too much acetaminophen. If you have questions or do not understand the information, ask your pharmacist or healthcare provider to explain it to you before you take the OTC medicine.  Managing nausea  Some people have an upset stomach after surgery. This is often because of anesthesia, pain, or pain medicine, or the stress of surgery. These tips will help you handle nausea and eat healthy foods as you get better. If you were on a special food plan before surgery, ask your healthcare provider if you should follow it while you get better. These tips may help:  · Do not push yourself to eat. Your body will tell you when to eat and how much.  · Start off with clear liquids and soup. They are easier to digest.  · Next try semi-solid foods, such as mashed potatoes, applesauce, and gelatin, as you feel ready.  · Slowly move to solid foods. Dont eat fatty, rich, or spicy foods at first.  · Do not force yourself to have 3 large meals a day. Instead eat smaller amounts more often.  · Take pain medicines with a small amount of solid food, such as crackers or toast, to avoid nausea.     Call your surgeon if  · You still have pain an hour after taking medicine. The medicine may not be strong enough.  · You feel too sleepy, dizzy, or groggy. The medicine may be too strong.  · You have side effects like nausea, vomiting, or skin changes, such as rash, itching, or hives.       If you have obstructive sleep apnea  You were given anesthesia medicine during surgery to keep you comfortable and free of pain. After surgery, you may have more apnea spells because of this medicine and  other medicines you were given. The spells may last longer than usual.   At home:  · Keep using the continuous positive airway pressure (CPAP) device when you sleep. Unless your healthcare provider tells you not to, use it when you sleep, day or night. CPAP is a common device used to treat obstructive sleep apnea.  · Talk with your provider before taking any pain medicine, muscle relaxants, or sedatives. Your provider will tell you about the possible dangers of taking these medicines.  Date Last Reviewed: 12/1/2016 © 2000-2017 [employer]. [address] [place] [address]. All rights reserved. This information is not intended as a substitute for professional medical care. Always follow your healthcare professional's instructions.

## 2019-06-26 NOTE — DISCHARGE SUMMARY
[facility]  Discharge Note  Short Stay    Admit Date: 6/26/2019    Discharge Date and Time: 6/26/2019 11:13 AM     Attending Physician: Rip Guy MD     Discharge Provider: Rip Guy    Diagnoses:  Active Hospital Problems    Diagnosis  POA    *Acquired pes planovalgus of right foot [M21.41]  Yes      Resolved Hospital Problems   No resolved problems to display.       Discharged Condition: good    Hospital Course: Patient was admitted for an outpatient procedure and tolerated the procedure well with no complications.    Final Diagnoses: Same as principal problem.    Disposition: Home or Self Care    Follow up/Patient Instructions:    Medications:  Reconciled Home Medications:      Medication List      START taking these medications    ondansetron 4 MG Tbdl  Commonly known as:  ZOFRAN-ODT  Take 2 tablets (8 mg total) by mouth every 8 (eight) hours as needed.     oxyCODONE-acetaminophen  mg per tablet  Commonly known as:  PERCOCET  Take 1 tablet by mouth every 4 (four) hours as needed for Pain.        CONTINUE taking these medications    b complex vitamins tablet  Take 1 tablet by mouth once daily.     calcium-vitamin D 250 (625)-125 mg-unit per tablet  Commonly known as:  OSCAL  Take 1 tablet by mouth once daily.     CRYSELLE (28) 0.3-30 mg-mcg per tablet  Generic drug:  norgestrel-ethinyl estradiol  TAKE 1 TABLET BY MOUTH ONCE A DAY CONTINUOUSLY. SKIP SUGAR PILLS     ferrous sulfate 325 mg (65 mg iron) Tab tablet  Commonly known as:  FEOSOL  Take 325 mg by mouth daily with breakfast.     magnesium 30 mg Tab  Take by mouth once.     pravastatin 20 MG tablet  Commonly known as:  PRAVACHOL  Take 20 mg by mouth nightly.     VITAMIN C 250 mg Chew  Generic drug:  ascorbic acid (vitamin C)  Take by mouth.     zinc gluconate 50 mg tablet  Take 50 mg by mouth once daily.          Discharge Procedure Orders   CRUTCHES FOR HOME USE     Order Specific Question Answer Comments   Type: Axillary   "  Height: 5' 6" (1.676 m)    Weight: 59 kg (130 lb)    Length of need (1-99 months): 2    Vendor: Other (use comments) [facility]   Expected Date of Delivery: 6/26/2019      Diet general     Call MD for:  temperature >100.4     Call MD for:  persistent nausea and vomiting     Call MD for:  severe uncontrolled pain     Call MD for:  difficulty breathing, headache or visual disturbances     Call MD for:  redness, tenderness, or signs of infection (pain, swelling, redness, odor or green/yellow discharge around incision site)     Call MD for:  hives     Call MD for:  persistent dizziness or light-headedness     Call MD for:  extreme fatigue     Keep surgical extremity elevated     No driving, operating heavy equipment or signing legal documents while taking pain medication     Leave dressing on - Keep it clean, dry, and intact until clinic visit     Non weight bearing     Follow-up Information     Rip Guy MD In 2 weeks.    Specialty:  Orthopedic Surgery  Contact information:  [address] [place] [address] [phone]                   Discharge Procedure Orders (must include Diet, Follow-up, Activity):   Discharge Procedure Orders (must include Diet, Follow-up, Activity)   CRUTCHES FOR HOME USE     Order Specific Question Answer Comments   Type: Axillary    Height: 5' 6" (1.676 m)    Weight: 59 kg (130 lb)    Length of need (1-99 months): 2    Vendor: Other (use comments) [facility]   Expected Date of Delivery: 6/26/2019      Diet general     Call MD for:  temperature >100.4     Call MD for:  persistent nausea and vomiting     Call MD for:  severe uncontrolled pain     Call MD for:  difficulty breathing, headache or visual disturbances     Call MD for:  redness, tenderness, or signs of infection (pain, swelling, redness, odor or green/yellow discharge around incision site)     Call MD for:  hives     Call MD for:  persistent dizziness or light-headedness     Call MD for:  extreme fatigue     Keep surgical " extremity elevated     No driving, operating heavy equipment or signing legal documents while taking pain medication     Leave dressing on - Keep it clean, dry, and intact until clinic visit     Non weight bearing

## 2019-06-26 NOTE — TRANSFER OF CARE
"Anesthesia Transfer of Care Note    Patient: Lisa Simmons    Procedure(s) Performed: Procedure(s) (LRB):  RESECTION, MUSCLE, GASTROCNEMIUS (Right)  OSTEOTOMY, CALCANEUS (Right)  OSTEOTOMY, FOOT (Right)  OSTEOTOMY, TARSAL BONE (Right)    Patient location: PACU    Anesthesia Type: general    Transport from OR: Transported from OR on 2-3 L/min O2 by NC with adequate spontaneous ventilation    Post pain: adequate analgesia    Post assessment: no apparent anesthetic complications and tolerated procedure well    Post vital signs: stable    Level of consciousness: awake    Nausea/Vomiting: no nausea/vomiting    Complications: none    Transfer of care protocol was followed      Last vitals:   Visit Vitals  /65 (BP Location: Left arm, Patient Position: Lying)   Pulse 87   Temp 36.9 °C (98.4 °F) (Skin)   Resp 13   Ht 5' 6" (1.676 m)   Wt 59 kg (130 lb)   SpO2 100%   Breastfeeding? No   BMI 20.98 kg/m²     "

## 2019-06-26 NOTE — BRIEF OP NOTE
OPERATIVE NOTE     DATE: 6/26/2019 TIME: 11:09 AM     PATIENT NAME: Lisa Simmons     PRE-OPERATIVE DIAGNOSIS: 1) Right foot pes planovalgus 2) Right foot metatarsus abductus 3) Right foot subluxation talonavicular joint 4) Right equinus contracture     POST-OPERATIVE DIAGNOSIS: 1) Right foot pes planovalgus 2) Right foot metatarsus abductus 3) Right foot subluxation talonavicular joint 4) Right equinus contracture     PROCEDURE: 1) Right gastrocnemius recession 2) Right medial sliding calcaneal osteotomy 3) Right Ramos Calcaneal osteotomy 3) Right Cotton medial cuneiform osteotomy     SURGEON: Rip Guy MD     ANESTHESIA TYPE: GETA     SPECIMENS SENT: NONE     COMPLICATIONS: NONE    BLOOD LOSS: < 10 cc    ASSISTANT: ZAHRA Livingston

## 2019-06-26 NOTE — OP NOTE
OPERATIVE NOTE     DATE: 6/26/2019 TIME: 11:09 AM     PATIENT NAME: Lisa Simmons     PRE-OPERATIVE DIAGNOSIS: 1) Right foot pes planovalgus 2) Right foot metatarsus abductus 3) Right foot subluxation talonavicular joint 4) Right equinus contracture     POST-OPERATIVE DIAGNOSIS: 1) Right foot pes planovalgus 2) Right foot metatarsus abductus 3) Right foot subluxation talonavicular joint 4) Right equinus contracture     PROCEDURE: 1) Right gastrocnemius recession 2) Right medial sliding calcaneal osteotomy 3) Right Ramos Calcaneal osteotomy 3) Right Cotton medial cuneiform osteotomy     SURGEON: Rip Guy MD     ANESTHESIA TYPE: GETA     SPECIMENS SENT: NONE     COMPLICATIONS: NONE    BLOOD LOSS: < 10 cc    ASSISTANT: ZAHRA Livingston      Procedure in detail:  After appropriate informed consent was obtained. The patient was placed in the supine position on the operating table. The Right lower extremity was then prepped and draped in the usual sterile fashion. Tourniquet was raised to 300 mmHg after esmarch exsanguination of the limb.     Gastrocnemius recession was performed by making a 3 cm incision over the medial aspect of the calf at the musculotendinous junction and litler scissors were used to cut through the fascia of the gastrocneumius only in order to lengthen the tendon.     Next a separate 4 cm incision was made over the lateral aspect of the calcaneus and a key elevator was used to clear the periosteum from the calcaneus. The skin flaps were retracted using blunt weidlander to protect the sural nerve. Sagittal saw was used to start the osteotomy in the posterior aspect of the calcaneus. Osteotome was used to complete the osteotomy and lamina spreaders were placed within the osteotomy site to help with soft tissue release. The calcaneus was then translated medially about 1 cm and the lateral shelf was cut with the sagittal saw and tamped into the osteotomy site. One [facility]  cannulated 7.0 x 45 mm  headless compression screws was placed over the guidewire to compress the osteotomy site. The patient's bone quality was osteoporotic therefore pre-drilling was not required. Screw placement was confirmed under fluoro.     Next a separate 5-cm longitudinal incision was made overlying the lateral aspect of the foot in line the with the tip of the distal fibula and the base of the 4th metatarsal using a #15 blade through the skin and subcutaneous tissue down to the level of the bone. Key elevator was used to clear the soft tissue and periosteum from the lateral aspect of the calcaneus. Lateral opening wedge osteotomy was performed using sagittal saw and 1/2 inch osteotome. Trial for the MTF allograft wedges was placed and a 6 mm Bone graft wedge was tamped into place. There was  good alignment and reduction of the calcaneocuboid joint and the talonavicular joint under fluoroscopy. Next a [employer] 18 mm staple was placed across the osteotomy site for fixation.     Next attention was then turned to the medial aspect of the foot where a separate 3 cm incision was made overlying the dorsum of the medial cuneiform. The location for the osteotomy was confirmed and marked under fluoro. The dorsal opening wedge osteotomy was made using sagittal saw and 1/2 inch osteotome. Trial for the MTF allograft wedges was placed and a 5 mm Bone graft wedge was tamped into place. Next a [employer] 13 mm staple was placed across the osteotomy site for fixation. There was very good alignment and reduction of the calcaneocuboid joint and the talonavicular joint under fluoroscopy.   The incisions were irrigated with normal saline. Adequate hemostasis was achieved using bovie cautery. The deep layer of the incisions were re-approximated using 0-monocyrl in an interrupted fashion. The subcutaneous layer was re-approximated using 2.0-monocyrl in an interrupted fashion. The skin was re-approximated using skin stapler.  Sterile dressing using xeroform, bacitracin, 4x8s, ABDs, cast padding, posterior splint and stirrups with the foot in neutral dorsiflexion was placed. The patient tolerated the procedure well without complications. I was present and scrubbed for the entire case.

## 2019-06-27 ENCOUNTER — NURSE TRIAGE (OUTPATIENT)
Dept: ADMINISTRATIVE | Facility: CLINIC | Age: 49
End: 2019-06-27

## 2019-06-27 NOTE — TELEPHONE ENCOUNTER
----- Message from Hi Gonsales sent at 6/27/2019  1:37 PM CDT -----  Contact: Brady  Type: Needs Medical Advice    Who Called:  Patient's spouse Brady  Symptoms (please be specific):    How long has patient had these symptoms:    Pharmacy name and phone #:    Best Call Back Number: [phone]  Additional Information:  Requesting a call back,have questions regarding patient medication

## 2019-06-27 NOTE — TELEPHONE ENCOUNTER
Patient will run out of pain medication on Saturday as she has been taking the medication every 4 hours.  is afraid that she will not be able to go through the weekend without pain medication. Do you want to refill?

## 2019-06-27 NOTE — TELEPHONE ENCOUNTER
.    Reason for Disposition   Prescription refill request for a controlled substance (such as most ADHD meds or narcotics)    Protocols used: MEDICATION QUESTION CALL-P-AH    Patient  called requesting. More percocet for over the weekend. States that patient may be out of medication by the weekend and he is concerned about her pain manangement. Education provided. Caller ended call with triage nurse to speak to MD incoming call. Will message MD

## 2019-06-28 RX ORDER — OXYCODONE AND ACETAMINOPHEN 10; 325 MG/1; MG/1
1 TABLET ORAL EVERY 6 HOURS PRN
Qty: 22 TABLET | Refills: 0 | Status: SHIPPED | OUTPATIENT
Start: 2019-06-28 | End: 2019-07-05 | Stop reason: SDUPTHER

## 2019-07-03 ENCOUNTER — TELEPHONE (OUTPATIENT)
Dept: ORTHOPEDICS | Facility: CLINIC | Age: 49
End: 2019-07-03

## 2019-07-03 NOTE — TELEPHONE ENCOUNTER
----- Message from Xavi Esquivel sent at 7/3/2019 10:00 AM CDT -----  Contact: Brady /   Has several question regarding recovery, [phone]

## 2019-07-03 NOTE — TELEPHONE ENCOUNTER
"Spoke to pt's .  states that patient is experiencing a burning sensation. Instructed that this is her nerves "waking" up from the nerve block.  states that pt has approximately 20 tablets left of pain medication. Instructed on keeping foot elevated higher than the heart, continue to remain non weight bearing.  stated understanding.  "

## 2019-07-05 RX ORDER — OXYCODONE AND ACETAMINOPHEN 10; 325 MG/1; MG/1
1 TABLET ORAL EVERY 6 HOURS PRN
Qty: 22 TABLET | Refills: 0 | Status: SHIPPED | OUTPATIENT
Start: 2019-07-05 | End: 2019-07-16

## 2019-07-05 NOTE — TELEPHONE ENCOUNTER
I left message stating patient's Rx refill has been approved and sent to patient's pharmacy.  I asked for pt to call with any other concerns or questions.

## 2019-07-05 NOTE — TELEPHONE ENCOUNTER
----- Message from Kaylin Lynch MA sent at 7/5/2019 12:11 PM CDT -----  Contact:    Refill on pain medication   Pharmacy [facility]   Call back

## 2019-07-11 DIAGNOSIS — M21.41 ACQUIRED PES PLANOVALGUS OF RIGHT FOOT: Primary | ICD-10-CM

## 2019-07-16 ENCOUNTER — DOCUMENTATION ONLY (OUTPATIENT)
Dept: ORTHOPEDICS | Facility: CLINIC | Age: 49
End: 2019-07-16

## 2019-07-16 ENCOUNTER — OFFICE VISIT (OUTPATIENT)
Dept: ORTHOPEDICS | Facility: CLINIC | Age: 49
End: 2019-07-16
Payer: COMMERCIAL

## 2019-07-16 ENCOUNTER — HOSPITAL ENCOUNTER (OUTPATIENT)
Dept: RADIOLOGY | Facility: HOSPITAL | Age: 49
Discharge: HOME | End: 2019-07-16
Attending: ORTHOPAEDIC SURGERY
Payer: COMMERCIAL

## 2019-07-16 VITALS
SYSTOLIC BLOOD PRESSURE: 105 MMHG | BODY MASS INDEX: 20.9 KG/M2 | HEART RATE: 77 BPM | WEIGHT: 130.06 LBS | DIASTOLIC BLOOD PRESSURE: 73 MMHG | HEIGHT: 66 IN

## 2019-07-16 DIAGNOSIS — M21.41 ACQUIRED PES PLANOVALGUS OF RIGHT FOOT: Primary | ICD-10-CM

## 2019-07-16 DIAGNOSIS — M21.41 ACQUIRED PES PLANOVALGUS OF RIGHT FOOT: ICD-10-CM

## 2019-07-16 PROCEDURE — 73630 XR FOOT COMPLETE 3 VIEW RIGHT: ICD-10-PCS | Mod: 26,RT,, | Performed by: RADIOLOGY

## 2019-07-16 PROCEDURE — 99999 PR PBB SHADOW E&M-EST. PATIENT-LVL III: ICD-10-PCS | Mod: PBBFAC,,, | Performed by: ORTHOPAEDIC SURGERY

## 2019-07-16 PROCEDURE — 73630 X-RAY EXAM OF FOOT: CPT | Mod: 26,RT,, | Performed by: RADIOLOGY

## 2019-07-16 PROCEDURE — 99024 POSTOP FOLLOW-UP VISIT: CPT | Mod: S$GLB,,, | Performed by: ORTHOPAEDIC SURGERY

## 2019-07-16 PROCEDURE — 73630 X-RAY EXAM OF FOOT: CPT | Mod: TC,PO,RT

## 2019-07-16 PROCEDURE — 99024 PR POST-OP FOLLOW-UP VISIT: ICD-10-PCS | Mod: S$GLB,,, | Performed by: ORTHOPAEDIC SURGERY

## 2019-07-16 PROCEDURE — 29405 APPL SHORT LEG CAST: CPT | Mod: 58,RT,S$GLB, | Performed by: ORTHOPAEDIC SURGERY

## 2019-07-16 PROCEDURE — 29405 PR APPLY SHORT LEG CAST: ICD-10-PCS | Mod: 58,RT,S$GLB, | Performed by: ORTHOPAEDIC SURGERY

## 2019-07-16 PROCEDURE — 99999 PR PBB SHADOW E&M-EST. PATIENT-LVL III: CPT | Mod: PBBFAC,,, | Performed by: ORTHOPAEDIC SURGERY

## 2019-07-16 RX ORDER — OXYCODONE AND ACETAMINOPHEN 5; 325 MG/1; MG/1
1 TABLET ORAL EVERY 8 HOURS PRN
Qty: 12 TABLET | Refills: 0 | Status: SHIPPED | OUTPATIENT
Start: 2019-07-16 | End: 2023-06-08

## 2019-07-16 NOTE — PROGRESS NOTES
Subjective:      Patient ID: Lisa Simmons is a 48 y.o. female.    Chief Complaint: Post-op Evaluation of the Right Foot and Right Foot Post Op (gastroc recession; calc, amezquita, + cotton osteotomy DOS 6/26/19)    Doing very well today. She rates her pain as 3/10 today.   Social History     Occupational History    Not on file   Tobacco Use    Smoking status: Never Smoker    Smokeless tobacco: Never Used   Substance and Sexual Activity    Alcohol use: Yes     Comment: occasionally    Drug use: Never    Sexual activity: Not on file            Objective:    Ortho Exam   RLE: neurovascularly intact, incisions healing well, No signs of infection, mild swelling. Moves toes well.         XRAYS: 3 views of right foot obtained and reviewed today reveal good correction. Hardware is intact  .   Assessment:      s/p flat foot reconstruction    Plan:       Sutures were removed today and steri-strips were placed, except lateral incisions. Short leg cast applied. Non-weightbearing. F/u 2 weeks with xray out of plaster right foot.

## 2019-07-26 DIAGNOSIS — M21.41 ACQUIRED PES PLANOVALGUS OF RIGHT FOOT: Primary | ICD-10-CM

## 2019-07-30 ENCOUNTER — OFFICE VISIT (OUTPATIENT)
Dept: ORTHOPEDICS | Facility: CLINIC | Age: 49
End: 2019-07-30
Payer: COMMERCIAL

## 2019-07-30 ENCOUNTER — HOSPITAL ENCOUNTER (OUTPATIENT)
Dept: RADIOLOGY | Facility: HOSPITAL | Age: 49
Discharge: HOME | End: 2019-07-30
Attending: ORTHOPAEDIC SURGERY
Payer: COMMERCIAL

## 2019-07-30 VITALS
WEIGHT: 130.06 LBS | SYSTOLIC BLOOD PRESSURE: 126 MMHG | DIASTOLIC BLOOD PRESSURE: 90 MMHG | HEIGHT: 66 IN | BODY MASS INDEX: 20.9 KG/M2 | HEART RATE: 92 BPM

## 2019-07-30 DIAGNOSIS — M21.41 ACQUIRED PES PLANOVALGUS OF RIGHT FOOT: Primary | ICD-10-CM

## 2019-07-30 DIAGNOSIS — M21.41 ACQUIRED PES PLANOVALGUS OF RIGHT FOOT: ICD-10-CM

## 2019-07-30 PROCEDURE — 29405 APPL SHORT LEG CAST: CPT | Mod: 58,RT,S$GLB, | Performed by: ORTHOPAEDIC SURGERY

## 2019-07-30 PROCEDURE — 29405 PR APPLY SHORT LEG CAST: ICD-10-PCS | Mod: 58,RT,S$GLB, | Performed by: ORTHOPAEDIC SURGERY

## 2019-07-30 PROCEDURE — 99999 PR PBB SHADOW E&M-EST. PATIENT-LVL III: ICD-10-PCS | Mod: PBBFAC,,, | Performed by: ORTHOPAEDIC SURGERY

## 2019-07-30 PROCEDURE — 73630 X-RAY EXAM OF FOOT: CPT | Mod: 26,RT,, | Performed by: RADIOLOGY

## 2019-07-30 PROCEDURE — 73630 XR FOOT COMPLETE 3 VIEW RIGHT: ICD-10-PCS | Mod: 26,RT,, | Performed by: RADIOLOGY

## 2019-07-30 PROCEDURE — 99024 PR POST-OP FOLLOW-UP VISIT: ICD-10-PCS | Mod: S$GLB,,, | Performed by: ORTHOPAEDIC SURGERY

## 2019-07-30 PROCEDURE — 99999 PR PBB SHADOW E&M-EST. PATIENT-LVL III: CPT | Mod: PBBFAC,,, | Performed by: ORTHOPAEDIC SURGERY

## 2019-07-30 PROCEDURE — 73630 X-RAY EXAM OF FOOT: CPT | Mod: TC,PO,RT

## 2019-07-30 PROCEDURE — 99024 POSTOP FOLLOW-UP VISIT: CPT | Mod: S$GLB,,, | Performed by: ORTHOPAEDIC SURGERY

## 2019-07-30 NOTE — PROGRESS NOTES
Subjective:      Patient ID: Lisa Simmons is a 49 y.o. female.    Chief Complaint: Post-op Evaluation of the Right Foot and Post-op Evaluation (s/p gastroc recession; jarred, amezquita, cotton osteotomies 6/26/19)    Doing very well today. She rates her pain as 3/10 today.   Social History     Occupational History    Not on file   Tobacco Use    Smoking status: Never Smoker    Smokeless tobacco: Never Used   Substance and Sexual Activity    Alcohol use: Yes     Comment: occasionally    Drug use: Never    Sexual activity: Not on file            Objective:    Ortho Exam   RLE: neurovascularly intact, incisions healing well, No signs of infection, mild swelling. Moves toes well.         XRAYS: 3 views of right foot obtained and reviewed today reveal good correction. Hardware is intact  . There is interval progression of healing.   Assessment:      s/p flat foot reconstruction    Plan:        Short leg cast applied. Non-weightbearing. F/u 2 weeks with xray out of plaster right foot and calcaneus.

## 2019-08-09 DIAGNOSIS — M21.41 ACQUIRED PES PLANOVALGUS OF RIGHT FOOT: Primary | ICD-10-CM

## 2019-08-13 ENCOUNTER — HOSPITAL ENCOUNTER (OUTPATIENT)
Dept: RADIOLOGY | Facility: HOSPITAL | Age: 49
Discharge: HOME | End: 2019-08-13
Attending: ORTHOPAEDIC SURGERY
Payer: COMMERCIAL

## 2019-08-13 ENCOUNTER — OFFICE VISIT (OUTPATIENT)
Dept: ORTHOPEDICS | Facility: CLINIC | Age: 49
End: 2019-08-13
Payer: COMMERCIAL

## 2019-08-13 VITALS
DIASTOLIC BLOOD PRESSURE: 81 MMHG | HEART RATE: 91 BPM | HEIGHT: 66 IN | SYSTOLIC BLOOD PRESSURE: 109 MMHG | WEIGHT: 130.06 LBS | BODY MASS INDEX: 20.9 KG/M2

## 2019-08-13 DIAGNOSIS — M21.41 ACQUIRED PES PLANOVALGUS OF RIGHT FOOT: ICD-10-CM

## 2019-08-13 DIAGNOSIS — M21.41 ACQUIRED PES PLANOVALGUS OF RIGHT FOOT: Primary | ICD-10-CM

## 2019-08-13 PROCEDURE — 99024 PR POST-OP FOLLOW-UP VISIT: ICD-10-PCS | Mod: S$GLB,,, | Performed by: ORTHOPAEDIC SURGERY

## 2019-08-13 PROCEDURE — 99024 POSTOP FOLLOW-UP VISIT: CPT | Mod: S$GLB,,, | Performed by: ORTHOPAEDIC SURGERY

## 2019-08-13 PROCEDURE — 73630 X-RAY EXAM OF FOOT: CPT | Mod: TC,PO,RT

## 2019-08-13 PROCEDURE — 73650 X-RAY EXAM OF HEEL: CPT | Mod: 26,59,RT, | Performed by: RADIOLOGY

## 2019-08-13 PROCEDURE — 73650 XR CALCANEUS 2 VIEW RIGHT: ICD-10-PCS | Mod: 26,59,RT, | Performed by: RADIOLOGY

## 2019-08-13 PROCEDURE — 99999 PR PBB SHADOW E&M-EST. PATIENT-LVL III: CPT | Mod: PBBFAC,,, | Performed by: ORTHOPAEDIC SURGERY

## 2019-08-13 PROCEDURE — 73630 X-RAY EXAM OF FOOT: CPT | Mod: 26,RT,, | Performed by: RADIOLOGY

## 2019-08-13 PROCEDURE — 97760 ORTHOTIC MGMT&TRAING 1ST ENC: CPT | Mod: S$GLB,,, | Performed by: ORTHOPAEDIC SURGERY

## 2019-08-13 PROCEDURE — 73630 XR FOOT COMPLETE 3 VIEW RIGHT: ICD-10-PCS | Mod: 26,RT,, | Performed by: RADIOLOGY

## 2019-08-13 PROCEDURE — 73650 X-RAY EXAM OF HEEL: CPT | Mod: TC,PO,RT

## 2019-08-13 PROCEDURE — 99999 PR PBB SHADOW E&M-EST. PATIENT-LVL III: ICD-10-PCS | Mod: PBBFAC,,, | Performed by: ORTHOPAEDIC SURGERY

## 2019-08-13 PROCEDURE — 97760 PR ORTHOTIC MGMT&TRAINJ INITIAL ENC EA 15 MINS: ICD-10-PCS | Mod: S$GLB,,, | Performed by: ORTHOPAEDIC SURGERY

## 2019-08-13 NOTE — PROGRESS NOTES
Subjective:      Patient ID: Lisa Simmons is a 49 y.o. female.    Chief Complaint: Post-op Evaluation of the Right Foot and Post-op Evaluation (right foot gastroc recession; calc, amezquita, cotton osteotomies)    Doing very well today. She rates her pain as 1/10 today.   Social History     Occupational History    Not on file   Tobacco Use    Smoking status: Never Smoker    Smokeless tobacco: Never Used   Substance and Sexual Activity    Alcohol use: Yes     Comment: occasionally    Drug use: Never    Sexual activity: Not on file            Objective:    Ortho Exam   RLE: neurovascularly intact, incisions healing well, No signs of infection, mild swelling. Moves toes well.         XRAYS: 3 views of right foot obtained and reviewed today reveal good correction. Hardware is intact  . There is interval progression of healing.   Assessment:      s/p flat foot reconstruction    Plan:       We performed a custom orthotic/brace adjustment, fitting and training with the patient today. The patient demonstrated understanding and proper care. This was performed for 15 minutes.  Short boot was given.   Weight bearing as tolerated.  F/u 2 weeks with xray  right foot and calcaneus. I will most likely start her in PT at her next visit.

## 2019-08-23 DIAGNOSIS — M21.41 ACQUIRED PES PLANOVALGUS OF RIGHT FOOT: Primary | ICD-10-CM

## 2019-08-27 ENCOUNTER — HOSPITAL ENCOUNTER (OUTPATIENT)
Dept: RADIOLOGY | Facility: HOSPITAL | Age: 49
Discharge: HOME | End: 2019-08-27
Attending: ORTHOPAEDIC SURGERY
Payer: COMMERCIAL

## 2019-08-27 ENCOUNTER — OFFICE VISIT (OUTPATIENT)
Dept: ORTHOPEDICS | Facility: CLINIC | Age: 49
End: 2019-08-27
Payer: COMMERCIAL

## 2019-08-27 VITALS
WEIGHT: 130.06 LBS | BODY MASS INDEX: 20.9 KG/M2 | SYSTOLIC BLOOD PRESSURE: 112 MMHG | HEART RATE: 77 BPM | HEIGHT: 66 IN | DIASTOLIC BLOOD PRESSURE: 64 MMHG

## 2019-08-27 DIAGNOSIS — M21.41 ACQUIRED PES PLANOVALGUS OF RIGHT FOOT: ICD-10-CM

## 2019-08-27 DIAGNOSIS — M21.41 ACQUIRED PES PLANOVALGUS OF RIGHT FOOT: Primary | ICD-10-CM

## 2019-08-27 PROCEDURE — 73630 X-RAY EXAM OF FOOT: CPT | Mod: 26,RT,, | Performed by: RADIOLOGY

## 2019-08-27 PROCEDURE — 73650 X-RAY EXAM OF HEEL: CPT | Mod: 59,TC,PO,RT

## 2019-08-27 PROCEDURE — 73630 XR FOOT COMPLETE 3 VIEW RIGHT: ICD-10-PCS | Mod: 26,RT,, | Performed by: RADIOLOGY

## 2019-08-27 PROCEDURE — 73650 X-RAY EXAM OF HEEL: CPT | Mod: 26,59,RT, | Performed by: RADIOLOGY

## 2019-08-27 PROCEDURE — 73630 X-RAY EXAM OF FOOT: CPT | Mod: TC,PO,RT

## 2019-08-27 PROCEDURE — 73650 XR CALCANEUS 2 VIEW RIGHT: ICD-10-PCS | Mod: 26,59,RT, | Performed by: RADIOLOGY

## 2019-08-27 PROCEDURE — 99999 PR PBB SHADOW E&M-EST. PATIENT-LVL III: ICD-10-PCS | Mod: PBBFAC,,, | Performed by: ORTHOPAEDIC SURGERY

## 2019-08-27 PROCEDURE — 99999 PR PBB SHADOW E&M-EST. PATIENT-LVL III: CPT | Mod: PBBFAC,,, | Performed by: ORTHOPAEDIC SURGERY

## 2019-08-27 PROCEDURE — 99024 POSTOP FOLLOW-UP VISIT: CPT | Mod: S$GLB,,, | Performed by: ORTHOPAEDIC SURGERY

## 2019-08-27 PROCEDURE — 99024 PR POST-OP FOLLOW-UP VISIT: ICD-10-PCS | Mod: S$GLB,,, | Performed by: ORTHOPAEDIC SURGERY

## 2019-08-29 ENCOUNTER — CLINICAL SUPPORT (OUTPATIENT)
Dept: REHABILITATION | Facility: HOSPITAL | Age: 49
End: 2019-08-29
Payer: COMMERCIAL

## 2019-08-29 DIAGNOSIS — M25.674 DECREASED RANGE OF MOTION OF RIGHT FOOT: ICD-10-CM

## 2019-08-29 DIAGNOSIS — R26.9 GAIT ABNORMALITY: ICD-10-CM

## 2019-08-29 PROBLEM — R29.898 WEAKNESS OF RIGHT FOOT: Status: ACTIVE | Noted: 2019-08-29

## 2019-08-29 PROCEDURE — 97162 PT EVAL MOD COMPLEX 30 MIN: CPT | Mod: PO | Performed by: PHYSICAL THERAPIST

## 2019-08-29 PROCEDURE — 97110 THERAPEUTIC EXERCISES: CPT | Mod: PO | Performed by: PHYSICAL THERAPIST

## 2019-08-29 NOTE — PLAN OF CARE
[facility]  Physical Therapy Initial Evaluation    Name: Lisa Simmons  Clinic Number: [id number]    Therapy Diagnosis:   Encounter Diagnoses   Name Primary?    Decreased range of motion of right foot     Gait abnormality      Physician: Rip Guy MD    Physician Orders: PT Eval and Treat   Medical Diagnosis from Referral: M21.41 (ICD-10-CM) - Acquired pes planovalgus of right foot  Evaluation Date: 8/29/2019  Authorization Period Expiration: 12/31/2019  Plan of Care Expiration: 10/9/19  Visit # / Visits authorized: 1/ 20    Time In: 1402  Time Out: 1500  Total Billable Time: 50 minutes    Precautions: Standard, 2 weeks in boot, wean    Subjective   Date of onset: 6/26/19  History of current condition - Lisa reports: s/p Post Op (gastroc recession; calc, amezquita, + cotton osteotomy DOS 6/26/19). 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 She had an incident of a horse falling on her and tearing tendons. Over time, this has lead to acquired pes planus of R foot. No complaints L foot. She began the first 3 weeks in a simple cast and the second 3 weeks in a hard cast. She is now in boot only with B axillary crutches.     Medical History:   No past medical history on file.    Surgical History:   Lisa Simmons  has a past surgical history that includes Breast biopsy (Left); Knee surgery (Left); Appendectomy; Clavicle surgery (Left); Closed reduction of fracture of maxilla; Resection of gastrocnemius muscle (Right, 6/26/2019); Calcaneal osteotomy (Right, 6/26/2019); Foot Osteotomy (Right, 6/26/2019); and Osteotomy of tarsal bone (Right, 6/26/2019).    Medications:   Lisa has a current medication list which includes the following prescription(s): ascorbic acid (vitamin c), b complex vitamins, calcium-vitamin d, cryselle (28), ferrous sulfate, magnesium, ondansetron, oxycodone-acetaminophen, pravastatin, and zinc  gluconate.    Allergies:   Review of patient's allergies indicates:  No Known Allergies     Imaging, xray 8/27/19:   Extensive postsurgical changes without worrisome detrimental change since 08/13/2019, continued healing is suspected.    Prior Therapy: conservative PT prior to sx  Social History:  lives with their spouse-Brady  Occupation:   Prior Level of Function: limping, circled foot out  Current Level of Function: modified gt with B axillary crutch and R boot, requires assistance for showering, not driving    Pain:  Current 0/10, worst 5/10, best 0/10   Location: right ankles  Description: Aching, Dull and Burning  Aggravating Factors: Walking and Night Time  Easing Factors: pain medication, rest and elevation    Pts goals: return to PLOF    Objective       Observation: Calf wound 100% healed, heel wound healed, dorsal wound healed, lat foot wound healed, lat heel wound healing with scant yellow soft eschar and min white soft eschar.    Gait: Impaired gait with B axillary crutches and R boot.    Range of Motion: AROM (PROM):    Ankle Left Right   Dorsiflexion +5 deg knee ext, +7 deg knee bent  degrees -15 deg knee ext, -13 deg knee bent degrees   Plantarflexion 55 degrees 40 degrees   Inversion 25 degrees 20 degrees   Eversion 20 degrees -6 (from neutral) degrees     Edema fig 8:  L foot: 47 cm  R foot: 51.5 cm    Strength:    Ankle Left Right   Dorsiflexion 5/5 3-/5   Plantarflexion 5/5 3-/5   Inversion 5/5 3-/5   Eversion 5/5 3-/5       CMS Impairment/Limitation/Restriction for FOTO foot/ankle Survey    Therapist reviewed FOTO scores for Lisa Simmons on 8/29/2019.   FOTO documents entered into [employer] - see Media section.    Limitation Score: 68%         TREATMENT   Treatment Time In: 1446  Treatment Time Out: 1454  Total Treatment time separate from Evaluation: 08 minutes    Lisa received therapeutic exercises to develop ROM and flexibility for 08 minutes including:  HEP given  "with handout to be given next session    Elevated APs x50  Elevated IV/EV x50  Calf stretch with strap 2 way 1x20" each    Home Exercises and Patient Education Provided    Education provided:   - Pt/family was provided educational information, including: role of PT, role of pt/caregiver, goals for PT, POC, scheduling, and attendance policy.- pt verbalized understanding.  - HEP     Lisa demonstrated good  understanding of the education provided.     Assessment   Lisa is a 49 y.o. female referred to outpatient Physical Therapy with a medical diagnosis of M21.41 (ICD-10-CM) - Acquired pes planovalgus of right foot. Pt presents with impaired R foot and ankle ROM, strength, gt, and balance.    Pt prognosis is Good.   Pt will benefit from skilled outpatient Physical Therapy to address the deficits stated above and in the chart below, provide pt/family education, and to maximize pt's level of independence.     Plan of care discussed with patient: Yes  Pt's spiritual, cultural and educational needs considered and patient is agreeable to the plan of care and goals as stated below:     Anticipated Barriers for therapy: none    Medical Necessity is demonstrated by the following  History  Co-morbidities and personal factors that may impact the plan of care Co-morbidities:   n/a    Personal Factors:   no deficits     low   Examination  Body Structures and Functions, activity limitations and participation restrictions that may impact the plan of care Body Regions:   lower extremities    Body Systems:    ROM  strength  balance  gait  transfers  motor control  motor learning    Participation Restrictions:   Impaired teaching horse riding    Activity limitations:   Learning and applying knowledge  no deficits    General Tasks and Commands  no deficits    Communication  no deficits    Mobility  walking  driving (bike, car, motorcycle)    Self care  washing oneself (bathing, drying, washing hands)  dressing    Domestic " Life  cooking  doing house work (cleaning house, washing dishes, laundry)    Interactions/Relationships  no deficits    Life Areas  no deficits    Community and Social Life  no deficits         high   Clinical Presentation evolving clinical presentation with changing clinical characteristics moderate   Decision Making/ Complexity Score: moderate     Goals:  Short Term Goals: 3 weeks   -Pt will demo R ankle AROM DF to -5 deg or better for improved heel strike once out of boot.  -Pt will demo R ankle AROM PF to 50 deg to assist with heel raise.  -Pt will walk without boot as advised by MD.    Long Term Goals: 6 weeks   -Improve FOTO impairment score to 43% for improved QOL.  -R ankle AROM and strength WFL for return to PLOF.    Plan   Plan of care Certification: 8/29/2019 to 9/9/19    Outpatient Physical Therapy 2 times weekly for 6 weeks to include the following interventions: Electrical Stimulation IFC, Gait Training, Manual Therapy, Moist Heat/ Ice, Neuromuscular Re-ed, Patient Education, Self Care, Therapeutic Activites, Therapeutic Exercise and Ultrasound.     Dolly Benítez, PT

## 2019-09-04 ENCOUNTER — CLINICAL SUPPORT (OUTPATIENT)
Dept: REHABILITATION | Facility: HOSPITAL | Age: 49
End: 2019-09-04
Payer: COMMERCIAL

## 2019-09-04 DIAGNOSIS — R26.9 GAIT ABNORMALITY: ICD-10-CM

## 2019-09-04 DIAGNOSIS — R29.898 WEAKNESS OF RIGHT FOOT: ICD-10-CM

## 2019-09-04 PROCEDURE — 97140 MANUAL THERAPY 1/> REGIONS: CPT | Mod: PO | Performed by: PHYSICAL THERAPIST

## 2019-09-04 PROCEDURE — 97110 THERAPEUTIC EXERCISES: CPT | Mod: PO | Performed by: PHYSICAL THERAPIST

## 2019-09-05 ENCOUNTER — CLINICAL SUPPORT (OUTPATIENT)
Dept: REHABILITATION | Facility: HOSPITAL | Age: 49
End: 2019-09-05
Payer: COMMERCIAL

## 2019-09-05 DIAGNOSIS — R26.9 GAIT ABNORMALITY: ICD-10-CM

## 2019-09-05 DIAGNOSIS — R29.898 WEAKNESS OF RIGHT FOOT: ICD-10-CM

## 2019-09-05 PROCEDURE — 97140 MANUAL THERAPY 1/> REGIONS: CPT | Mod: PO | Performed by: PHYSICAL THERAPIST

## 2019-09-05 PROCEDURE — 97110 THERAPEUTIC EXERCISES: CPT | Mod: PO | Performed by: PHYSICAL THERAPIST

## 2019-09-05 NOTE — PROGRESS NOTES
Subjective:      Patient ID: Lisa Simmons is a 49 y.o. female.    Chief Complaint: Post-op Evaluation of the Right Foot and Post-op Evaluation (S/P gastroc recession; calc, amezquita, & cotton osteotomies 6/26/19)    Doing very well today. She rates her pain as 0/10 today.   Social History     Occupational History    Not on file   Tobacco Use    Smoking status: Never Smoker    Smokeless tobacco: Never Used   Substance and Sexual Activity    Alcohol use: Yes     Comment: occasionally    Drug use: Never    Sexual activity: Not on file            Objective:    Ortho Exam   RLE: neurovascularly intact, incisions healing well, No signs of infection, mild swelling. Moves toes well. non-tender to palpation at the osteotomy sites. Very good alignment.         XRAYS: 3 views of right foot obtained and reviewed today reveal good correction. Hardware is intact  . There is interval progression of healing.   Assessment:      s/p flat foot reconstruction    Plan:       Ok to transition to a regular shoe over next 2 weeks. PT 2/6.  F/u 4 weeks with xray  right foot and calcaneus.

## 2019-09-05 NOTE — PROGRESS NOTES
".    Physical Therapy Daily Treatment Note     Name: Lisa Dave [facility]  Clinic Number: [id number]    Therapy Diagnosis:   Encounter Diagnoses   Name Primary?    Weakness of right foot     Gait abnormality      Physician: Rip Guy MD    Visit Date: 9/5/2019  Physician Orders: PT Eval and Treat   Medical Diagnosis from Referral: M21.41 (ICD-10-CM) - Acquired pes planovalgus of right foot  Evaluation Date: 8/29/2019  Authorization Period Expiration: 12/31/2019  Plan of Care Expiration: 10/9/19  Visit # / Visits authorized: 3/ 20    Time In: 1100  Time Out: 1200  Total Billable Time: 30 minutes    Precautions: Standard, 2 weeks in boot, wean    Subjective     Pt reports: no new complaints. Some swelling and compliance wearing the tuba . Some walking around home with one crutch. Using crutch on L as taught yesterday. Lat heal wound is no longer burning.  She was compliant with home exercise program.  Response to previous treatment: no adverse effect  Functional change: progressing ROM and gt, using 1 crutch HH distance    Pain: 0/10 pre tx and 2/10 post tx  Location: right feet      Objective     Lisa received therapeutic exercises to develop strength, ROM and flexibility for 20 minutes including:  Calf stretch 2 ways 3x30"    Elevated APs, IV, EV, CW, CCW x30  Seated IV/EV on towel x30  Seated HR x30  [place]  x2  Toe curls x30  WS seated for DF AAROM x30  PROM R ankle all directions x10 each    Lisa received the following manual therapy techniques: Joint mobilizations and Manual traction were applied to the: L foot and ankle for 10 minutes, including:  Gentle talar distraction  Gd I-II talar med and lat glides  Achilles t. Lifts and med-lat bending    Lisa participated in gait training to improve functional mobility and safety for 00  minutes, including:  Using (U) crutch on L, good modified 2 pt return.    Home Exercises Provided and Patient Education Provided     Education " provided:   - HEP    Written Home Exercises Provided: Patient instructed to cont prior HEP.  Exercises were reviewed and Lisa was able to demonstrate them prior to the end of the session.  Lisa demonstrated good  understanding of the education provided.     See EMR under Media for exercises provided 9/4/2019.    Assessment     Lisa tolerated tx well. Hypomobility is noted in her calcaneal movement 2:1/2. She is compliant with her HEP and has been progressed. Continued previous ex as is appropriate given one day since tx, adding WS AAROM.  Lisa is progressing well towards her goals.   Pt prognosis is Good.     Pt will continue to benefit from skilled outpatient physical therapy to address the deficits listed in the problem list box on initial evaluation, provide pt/family education and to maximize pt's level of independence in the home and community environment.     Pt's spiritual, cultural and educational needs considered and pt agreeable to plan of care and goals.    Anticipated barriers to physical therapy: none    Goals:   Short Term Goals: 3 weeks progressing to all  -Pt will demo R ankle AROM DF to -5 deg or better for improved heel strike once out of boot.  -Pt will demo R ankle AROM PF to 50 deg to assist with heel raise.  -Pt will walk without boot as advised by MD.     Long Term Goals: 6 weeks progressing to all  -Improve FOTO impairment score to 43% for improved QOL.  -R ankle AROM and strength WFL for return to PLOF.    Plan     Cont current plan for ROM and gt progression.    Dolly Benítez, PT

## 2019-09-05 NOTE — PROGRESS NOTES
".    Physical Therapy Daily Treatment Note     Name: Lisa Dave [facility]  Clinic Number: [id number]    Therapy Diagnosis:   Encounter Diagnoses   Name Primary?    Weakness of right foot     Gait abnormality      Physician: Rip Guy MD    Visit Date: 9/4/2019  Physician Orders: PT Eval and Treat   Medical Diagnosis from Referral: M21.41 (ICD-10-CM) - Acquired pes planovalgus of right foot  Evaluation Date: 8/29/2019  Authorization Period Expiration: 12/31/2019  Plan of Care Expiration: 10/9/19  Visit # / Visits authorized: 2/ 20    Time In: 1400  Time Out: 1445  Total Billable Time: 33 minutes    Precautions: Standard, 2 weeks in boot, wean    Subjective     Pt reports: no new complaints. Some swelling and compliance wearing the tuba . Some walking around home with one crutch. She had been using the crutch on R. Lat heal wound was burning, she put Neosporin on it and it is burning less.  She was compliant with home exercise program.  Response to previous treatment: no adverse effect  Functional change: progressing ROM and gt, using 1 crutch HH distance after today    Pain: 2/10  Location: right feet      Objective     Lisa received therapeutic exercises to develop strength, ROM and flexibility for 25 minutes including:  Calf stretch 2 ways 3x30"    Elevated APs, IV, EV, CW, CCW x30  Seated IV/EV on towel x30  Seated HR x30  [place]  x2  Toe curls x30    Lisa received the following manual therapy techniques: Joint mobilizations and Manual traction were applied to the: L foot and ankle for 08 minutes, including:  Gentle talar distraction  Gd I-II talar med and lat glides    Lisa participated in gait training to improve functional mobility and safety for 05  minutes, including:  Using (U) crutch on L, good modified 2 pt return.    Home Exercises Provided and Patient Education Provided     Education provided:   - HEP  - GAIT TRAINING ED    Written Home Exercises Provided: yes.  Exercises " were reviewed and Lisa was able to demonstrate them prior to the end of the session.  Lisa demonstrated good  understanding of the education provided.     See EMR under Media for exercises provided 9/4/2019.    Assessment     Lisa tolerated her first session well. Hypomobility is noted in her calcaneal movement 2:1/2. She is compliant with her HEP and has been progressed. Her wound is healing without concern today and cont to monitor as she is performing home care.  Lisa is progressing well towards her goals.   Pt prognosis is Good.     Pt will continue to benefit from skilled outpatient physical therapy to address the deficits listed in the problem list box on initial evaluation, provide pt/family education and to maximize pt's level of independence in the home and community environment.     Pt's spiritual, cultural and educational needs considered and pt agreeable to plan of care and goals.    Anticipated barriers to physical therapy: none    Goals:   Short Term Goals: 3 weeks progressing to all  -Pt will demo R ankle AROM DF to -5 deg or better for improved heel strike once out of boot.  -Pt will demo R ankle AROM PF to 50 deg to assist with heel raise.  -Pt will walk without boot as advised by MD.     Long Term Goals: 6 weeks progressing to all  -Improve FOTO impairment score to 43% for improved QOL.  -R ankle AROM and strength WFL for return to PLOF.    Plan     Cont current plan for ROM and gt progression.    Dolly Benítez, PT

## 2019-09-09 ENCOUNTER — CLINICAL SUPPORT (OUTPATIENT)
Dept: REHABILITATION | Facility: HOSPITAL | Age: 49
End: 2019-09-09
Payer: COMMERCIAL

## 2019-09-09 DIAGNOSIS — R26.9 GAIT ABNORMALITY: ICD-10-CM

## 2019-09-09 DIAGNOSIS — R29.898 WEAKNESS OF RIGHT FOOT: ICD-10-CM

## 2019-09-09 PROCEDURE — 97116 GAIT TRAINING THERAPY: CPT | Mod: PO | Performed by: PHYSICAL THERAPIST

## 2019-09-09 PROCEDURE — 97140 MANUAL THERAPY 1/> REGIONS: CPT | Mod: PO | Performed by: PHYSICAL THERAPIST

## 2019-09-09 PROCEDURE — 97110 THERAPEUTIC EXERCISES: CPT | Mod: PO | Performed by: PHYSICAL THERAPIST

## 2019-09-09 NOTE — PROGRESS NOTES
".    Physical Therapy Daily Treatment Note     [facility]  [facility] Number[facility] [id number]    Therapy Diagnosis:   Encounter Diagnoses   Name Primary?    Weakness of right foot     Gait abnormality      Physician: Rip Guy MD    Visit Date: 9/9/2019  Physician Orders: PT Eval and Treat   Medical Diagnosis from Referral: M21.41 (ICD-10-CM) - Acquired pes planovalgus of right foot  Evaluation Date: 8/29/2019  Authorization Period Expiration: 12/31/2019  Plan of Care Expiration: 10/9/19  Visit # / Visits authorized: 4/ 20    Time In: 1300  Time Out: 1350  Total Billable Time: 45 minutes    Precautions: Standard, 2 weeks in boot, wean    Subjective     Pt reports: no new complaints. Improving ROM is reported.   She was compliant with home exercise program.  Response to previous treatment: no adverse effect  Functional change: progressing ROM and gt, using 1 crutch HH distance    Pain: 0/10 pre tx and 0/10 post tx  Location: right feet      Objective     Lisa received therapeutic exercises to develop strength, ROM and flexibility for 28 minutes including:  Stationary bike with high seat 8' for ROM of R ankle  Calf stretch 2 ways 3x30"    Elevated APs, IV, EV, CW, CCW [id number] to HEP  Seated IV/EV on towel x30  Seated HR x30, against self resistance  [place]  x2  Toe curls x30  WS seated for DF AAROM 3x30"  PROM R ankle all directions x10 each    Lisa received the following manual therapy techniques: Joint mobilizations and Manual traction were applied to the: L foot and ankle for 10 minutes, including:  Gentle talar distraction  Gd I-II talar med and lat glides  Achilles t. Lifts and med-lat bending    Lisa participated in gait training to improve functional mobility and safety for 08  minutes, including:  (U) crutch and HHA of PT out of shoes and (U) due to not having 1 shoe and again barefoot with (U) crutch only in clinic.    Home Exercises Provided and Patient Education " Provided     Education provided:   - HEP  - walking in home with B shoes and 2 crutches, wean to 1 crutch when ready.    Written Home Exercises Provided: Patient instructed to cont prior HEP.  Exercises were reviewed and Lisa was able to demonstrate them prior to the end of the session.  Lisa demonstrated good  understanding of the education provided.     See EMR under Media for exercises provided 9/4/2019.    Assessment     Lisa ambulated in clinic with HHA and 1 crutch with good form. She also ambulated with (U) crutch only. She will begin this at home towards weaning boot altogether this week or next pending comfort and safety demonstrated in clinic.  Lisa is progressing well towards her goals.   Pt prognosis is Good.     Pt will continue to benefit from skilled outpatient physical therapy to address the deficits listed in the problem list box on initial evaluation, provide pt/family education and to maximize pt's level of independence in the home and community environment.     Pt's spiritual, cultural and educational needs considered and pt agreeable to plan of care and goals.    Anticipated barriers to physical therapy: none    Goals:   Short Term Goals: 3 weeks progressing to all  -Pt will demo R ankle AROM DF to -5 deg or better for improved heel strike once out of boot.  -Pt will demo R ankle AROM PF to 50 deg to assist with heel raise.  -Pt will walk without boot as advised by MD.     Long Term Goals: 6 weeks progressing to all  -Improve FOTO impairment score to 43% for improved QOL.  -R ankle AROM and strength WFL for return to PLOF.    Plan     Cont current plan for ROM and gt progression.    Dolly Benítez, PT

## 2019-09-12 ENCOUNTER — CLINICAL SUPPORT (OUTPATIENT)
Dept: REHABILITATION | Facility: HOSPITAL | Age: 49
End: 2019-09-12
Payer: COMMERCIAL

## 2019-09-12 DIAGNOSIS — R29.898 WEAKNESS OF RIGHT FOOT: ICD-10-CM

## 2019-09-12 DIAGNOSIS — R26.9 GAIT ABNORMALITY: ICD-10-CM

## 2019-09-12 PROCEDURE — 97140 MANUAL THERAPY 1/> REGIONS: CPT | Mod: PO | Performed by: PHYSICAL THERAPIST

## 2019-09-12 PROCEDURE — 97110 THERAPEUTIC EXERCISES: CPT | Mod: PO | Performed by: PHYSICAL THERAPIST

## 2019-09-12 PROCEDURE — 97116 GAIT TRAINING THERAPY: CPT | Mod: PO | Performed by: PHYSICAL THERAPIST

## 2019-09-12 NOTE — PROGRESS NOTES
".    Physical Therapy Daily Treatment Note     Name: Lisa Dave [facility]  Clinic Number: [id number]    Therapy Diagnosis:   Encounter Diagnoses   Name Primary?    Weakness of right foot     Gait abnormality      Physician: Rip Guy MD    Visit Date: 9/12/2019  Physician Orders: PT Eval and Treat   Medical Diagnosis from Referral: M21.41 (ICD-10-CM) - Acquired pes planovalgus of right foot  Evaluation Date: 8/29/2019  Authorization Period Expiration: 12/31/2019  Plan of Care Expiration: 10/9/19  Visit # / Visits authorized: 5/ 20    Time In: 1500  Time Out: 1600  Total Billable Time: 45 minutes    Precautions: Standard, 2 weeks in boot, wean    Subjective     Pt reports: no new complaints. Improving ROM is reported. Only one pair of shoes fits because of R ankle swelling.  She was compliant with home exercise program.  Response to previous treatment: no adverse effect  Functional change: progressing ROM and gt, using 1 and 2 shoes except in bad weather    Pain: 0/10 pre tx and 0/10 post tx  Location: right feet      Objective     Lisa received therapeutic exercises to develop strength, ROM and flexibility for 29 minutes including:  Stationary bike with high seat 8' for ROM of R ankle  Calf stretch 2 ways 3x30"    Elevated APs, IV, EV, CW, CCW [id number] to HEP  Seated IV/EV on towel x30  Seated HR x30, against self resistance  [place]  x2  Toe curls x30  WS seated for DF AAROM 3x30"  PROM R ankle all directions x10 each    Lisa received the following manual therapy techniques: Joint mobilizations and Manual traction were applied to the: L foot and ankle for 08 minutes, including:  Gentle talar distraction  Gd I-II talar med and lat glides    Lisa participated in gait training to improve functional mobility and safety for 08 minutes, including:  (U) crutch 2 laps and no crutch 2 laps both in shoes    Home Exercises Provided and Patient Education Provided     Education provided:   - " HEP  - walking in home with B shoes and 2 crutches, wean to 1 crutch when ready.    Written Home Exercises Provided: Patient instructed to cont prior HEP.  Exercises were reviewed and Lisa was able to demonstrate them prior to the end of the session.  Lisa demonstrated good  understanding of the education provided.     See EMR under Media for exercises provided 9/4/2019.    Assessment     Lisa demonstrated good use of (U) crutch and B shoes after training to eliminate L foot IV and improve heel strike B. This training effect was achieved. Pt to cont (U) crutch and shoes at this time, boot and other crutch may be during inclement weather. Pt to practice over the weekend. Progress strengthening next week.  Lisa is progressing well towards her goals.   Pt prognosis is Good.     Pt will continue to benefit from skilled outpatient physical therapy to address the deficits listed in the problem list box on initial evaluation, provide pt/family education and to maximize pt's level of independence in the home and community environment.     Pt's spiritual, cultural and educational needs considered and pt agreeable to plan of care and goals.    Anticipated barriers to physical therapy: none    Goals:   Short Term Goals: 3 weeks progressing to all  -Pt will demo R ankle AROM DF to -5 deg or better for improved heel strike once out of boot.  -Pt will demo R ankle AROM PF to 50 deg to assist with heel raise.  -Pt will walk without boot as advised by MD.     Long Term Goals: 6 weeks progressing to all  -Improve FOTO impairment score to 43% for improved QOL.  -R ankle AROM and strength WFL for return to PLOF.    Plan     Cont current plan for ROM and gt progression.    Dolly Benítez, PT

## 2019-09-17 ENCOUNTER — PATIENT MESSAGE (OUTPATIENT)
Dept: ORTHOPEDICS | Facility: CLINIC | Age: 49
End: 2019-09-17

## 2019-09-17 ENCOUNTER — CLINICAL SUPPORT (OUTPATIENT)
Dept: REHABILITATION | Facility: HOSPITAL | Age: 49
End: 2019-09-17
Payer: COMMERCIAL

## 2019-09-17 DIAGNOSIS — R26.9 GAIT ABNORMALITY: ICD-10-CM

## 2019-09-17 DIAGNOSIS — R29.898 WEAKNESS OF RIGHT FOOT: ICD-10-CM

## 2019-09-17 PROCEDURE — 97110 THERAPEUTIC EXERCISES: CPT | Mod: PO | Performed by: PHYSICAL THERAPIST

## 2019-09-17 PROCEDURE — 97010 HOT OR COLD PACKS THERAPY: CPT | Mod: PO | Performed by: PHYSICAL THERAPIST

## 2019-09-17 PROCEDURE — 97112 NEUROMUSCULAR REEDUCATION: CPT | Mod: PO | Performed by: PHYSICAL THERAPIST

## 2019-09-17 NOTE — PROGRESS NOTES
".    Physical Therapy Daily Treatment Note     Name: Lisa Dave [facility]  Clinic Number: [id number]    Therapy Diagnosis:   Encounter Diagnoses   Name Primary?    Weakness of right foot     Gait abnormality      Physician: Rip Guy MD    Visit Date: 9/17/2019  Physician Orders: PT Eval and Treat   Medical Diagnosis from Referral: M21.41 (ICD-10-CM) - Acquired pes planovalgus of right foot  Evaluation Date: 8/29/2019  Authorization Period Expiration: 12/31/2019  Plan of Care Expiration: 10/9/19  Visit # / Visits authorized: 6/ 20    Time In: 1400  Time Out: 1500  Total Billable Time: 45 minutes    Precautions: Standard, 2 weeks in boot, wean    Subjective     Pt reports: she was outside yesterday resting the foot without a covering. The healing lat ankle wound was red and looking worse. She showed a picture to PT.  She was compliant with home exercise program.  Response to previous treatment: no adverse effect  Functional change: progressing ROM and gt, using 1 and 2 shoes except in bad weather    Pain: 3/10 pre tx and 0/10 post tx  Location: right feet      Objective     Lisa received therapeutic exercises to develop strength, ROM and flexibility for 5 minutes including:    Stationary bike with high seat 8' for ROM of R ankle-np to look at wound healing  Calf stretch 2 ways 2'2'    DL HR 2x10 with HHA    Neuromuscular ex x 25 min:  R SLS with HHA 3x10", difficult  Tandem stance on floor 3x15" R/L    Gait x5 min:  Pre gait step with heel strike and WS over foam x15    Wound inspection and communication with MD office.    Lisa received the following manual therapy techniques: Joint mobilizations and Manual traction were applied to the: L foot and ankle for 00 minutes, including:  Gentle talar distraction  Gd I-II talar med and lat glides    Cold pack x10 to R foot and ankle.    Home Exercises Provided and Patient Education Provided     Education provided:   - HEP  - add balance and ankle 4 way, " yellow band given.    Written Home Exercises Provided: Patient instructed to cont prior HEP.  Exercises were reviewed and Lisa was able to demonstrate them prior to the end of the session.  Lisa demonstrated good  understanding of the education provided.     See EMR under Media for exercises provided 9/4/2019.    Assessment     Lisa showed a picture of wound with a red rash from yesterday. Inspection revealed remnants of the bad rash, but appears less damp and improving. Messaged MD office and they are working to review pictures sent by pt through [employer]. Pt demos poor SLB and fair tandem stance. Improving gt. Cont.  Pt prognosis is Good.     Pt will continue to benefit from skilled outpatient physical therapy to address the deficits listed in the problem list box on initial evaluation, provide pt/family education and to maximize pt's level of independence in the home and community environment.     Pt's spiritual, cultural and educational needs considered and pt agreeable to plan of care and goals.    Anticipated barriers to physical therapy: none    Goals:   Short Term Goals: 3 weeks progressing to all  -Pt will demo R ankle AROM DF to -5 deg or better for improved heel strike once out of boot.  -Pt will demo R ankle AROM PF to 50 deg to assist with heel raise.  -Pt will walk without boot as advised by MD.     Long Term Goals: 6 weeks progressing to all  -Improve FOTO impairment score to 43% for improved QOL.  -R ankle AROM and strength WFL for return to PLOF.    Plan     Cont current plan for ROM and gt progression.    Dolly Benítez, PT

## 2019-09-19 ENCOUNTER — CLINICAL SUPPORT (OUTPATIENT)
Dept: REHABILITATION | Facility: HOSPITAL | Age: 49
End: 2019-09-19
Payer: COMMERCIAL

## 2019-09-19 DIAGNOSIS — R29.898 WEAKNESS OF RIGHT FOOT: ICD-10-CM

## 2019-09-19 DIAGNOSIS — R26.9 GAIT ABNORMALITY: ICD-10-CM

## 2019-09-19 PROCEDURE — 97140 MANUAL THERAPY 1/> REGIONS: CPT | Mod: PO | Performed by: PHYSICAL THERAPIST

## 2019-09-19 PROCEDURE — 97110 THERAPEUTIC EXERCISES: CPT | Mod: PO | Performed by: PHYSICAL THERAPIST

## 2019-09-19 NOTE — PROGRESS NOTES
".    Physical Therapy Daily Treatment Note     Name: Lisa Dave [facility] Number: [id number]    Therapy Diagnosis:   Encounter Diagnoses   Name Primary?    Weakness of right foot     Gait abnormality      Physician: Rip Guy MD    Visit Date: 9/19/2019  Physician Orders: PT Eval and Treat   Medical Diagnosis from Referral: M21.41 (ICD-10-CM) - Acquired pes planovalgus of right foot  Evaluation Date: 8/29/2019  Authorization Period Expiration: 12/31/2019  Plan of Care Expiration: 10/9/19  Visit # / Visits authorized: 7/ 20    Time In: 1400  Time Out: 1500  Total Billable Time: 30 minutes    Precautions: Standard    Subjective     Pt reports: rash is improved, applying hydrocortisone cream. Improving strength and balance.  She was compliant with home exercise program.  Response to previous treatment: no adverse effect  Functional change: progressing ROM and gt    Pain: 3/10 pre tx and 0/10 post tx  Location: right feet      Objective     Lisa received therapeutic exercises to develop strength, ROM and flexibility for 25 minutes including:    Stationary bike with high seat 8' for ROM of R ankle-np to look at wound healing  Calf stretch 2 ways 2'2'    DL HR 2x10 with HHA  Ankle 4 way ytb    Neuromuscular ex x 10 min:  R SLS with HHA 3x10", difficult  Tandem stance on floor 3x30" R/L    Gait x0 min:  Pre gait step with heel strike and WS over foam x15    Wound inspection-healing, some scab removed that was loose-5    Lisa received the following manual therapy techniques: Joint mobilizations and Manual traction were applied to the: L foot and ankle for 08 minutes, including:  Gentle talar distraction  Gd I-III talar med and lat glides    Cold pack x00 to R foot and ankle.    Home Exercises Provided and Patient Education Provided     Education provided:   - HEP  - let remaining scab fall off, cont hydrocortisone cream as directed by MD    Written Home Exercises Provided: Patient instructed to " "cont prior HEP.  Exercises were reviewed and Lisa was able to demonstrate them prior to the end of the session.  Lisa demonstrated good  understanding of the education provided.     See EMR under Media for exercises provided 9/4/2019.    Assessment     Lisa continues ambulating without AD or boot. Rash is improving. Strength and balance improving. R SLS to 10". See goals below as STGs are met.  Pt prognosis is Good.     Pt will continue to benefit from skilled outpatient physical therapy to address the deficits listed in the problem list box on initial evaluation, provide pt/family education and to maximize pt's level of independence in the home and community environment.     Pt's spiritual, cultural and educational needs considered and pt agreeable to plan of care and goals.    Anticipated barriers to physical therapy: none    Goals:   Short Term Goals: 3 weeks progressing to all  -Pt will demo R ankle AROM DF to -5 deg or better for improved heel strike once out of boot. MET 9/19/19, -5 DEG.  -Pt will demo R ankle AROM PF to 50 deg to assist with heel raise. MET 9/19/19, 55 DEG.  -Pt will walk without boot as advised by MD. MET 9/19/19.     Long Term Goals: 6 weeks progressing to all  -Improve FOTO impairment score to 43% for improved QOL.  -R ankle AROM and strength WFL for return to PLOF.    Plan     Cont current plan for ROM and gt progression.    Dolly Benítez, PT  "

## 2019-09-24 ENCOUNTER — CLINICAL SUPPORT (OUTPATIENT)
Dept: REHABILITATION | Facility: HOSPITAL | Age: 49
End: 2019-09-24
Payer: COMMERCIAL

## 2019-09-24 DIAGNOSIS — R29.898 WEAKNESS OF RIGHT FOOT: ICD-10-CM

## 2019-09-24 DIAGNOSIS — R26.9 GAIT ABNORMALITY: ICD-10-CM

## 2019-09-24 PROCEDURE — 97110 THERAPEUTIC EXERCISES: CPT | Mod: PO | Performed by: PHYSICAL THERAPIST

## 2019-09-24 PROCEDURE — 97112 NEUROMUSCULAR REEDUCATION: CPT | Mod: PO | Performed by: PHYSICAL THERAPIST

## 2019-09-24 PROCEDURE — 97140 MANUAL THERAPY 1/> REGIONS: CPT | Mod: PO | Performed by: PHYSICAL THERAPIST

## 2019-09-26 ENCOUNTER — CLINICAL SUPPORT (OUTPATIENT)
Dept: REHABILITATION | Facility: HOSPITAL | Age: 49
End: 2019-09-26
Payer: COMMERCIAL

## 2019-09-26 DIAGNOSIS — R29.898 WEAKNESS OF RIGHT FOOT: ICD-10-CM

## 2019-09-26 DIAGNOSIS — R26.9 GAIT ABNORMALITY: ICD-10-CM

## 2019-09-26 PROCEDURE — 97140 MANUAL THERAPY 1/> REGIONS: CPT | Mod: PO | Performed by: PHYSICAL THERAPIST

## 2019-09-26 PROCEDURE — 97112 NEUROMUSCULAR REEDUCATION: CPT | Mod: PO | Performed by: PHYSICAL THERAPIST

## 2019-09-26 PROCEDURE — 97110 THERAPEUTIC EXERCISES: CPT | Mod: PO | Performed by: PHYSICAL THERAPIST

## 2019-09-26 NOTE — PROGRESS NOTES
"    Physical Therapy Daily Treatment Note     Name: Lisa Dave [facility] Number: [id number]    Therapy Diagnosis:   Encounter Diagnoses   Name Primary?    Weakness of right foot     Gait abnormality      Physician: Rip Guy MD    Visit Date: 9/26/2019  Physician Orders: PT Eval and Treat   Medical Diagnosis from Referral: M21.41 (ICD-10-CM) - Acquired pes planovalgus of right foot  Evaluation Date: 8/29/2019  Authorization Period Expiration: 12/31/2019  Plan of Care Expiration: 10/9/19  Visit # / Visits authorized: 9/ 20    Time In: 1100  Time Out: 1200  Total Billable Time: 52 minutes    Precautions: Standard    Subjective     Pt reports: rash free. Stiff R ankle. No more "burning".  She was compliant with home exercise program.  Response to previous treatment: no adverse effect  Functional change: progressing ROM and gt    Pain: 3/10 pre tx and 0/10 post tx  Location: right feet      Objective     Lisa received therapeutic exercises to develop strength, ROM and flexibility for 22 minutes including:    Stationary bike with high seat 8' for ROM of R ankle  Calf stretch 2 ways 2'2'    DL HR 2x10 without HHA  SL HR contralateral toe down with HHAx1 x20  Ankle 4 way rtb 3x10 each  Heel tap 4" with fair knee bend and DF 2x10  Step ups 6" 2x10    Neuromuscular ex x 10 min:  R SLS with HHA 3x30", difficult, 10" without UE assist  Tandem stance on foam 3x30" R/L  Tandem stance with rebounder toss 2x10 each  Rebounder march 2'    Lisa received the following manual therapy techniques: Joint mobilizations and Manual traction were applied to the: R foot and ankle for 20 minutes, including:  IASTM R post calf, scar, gastroc and soleus, knee flexed, and bent  talaocrural distraction  Gd I-IV talar med, lat, and post glides    Cold pack x00 to R foot and ankle.    Home Exercises Provided and Patient Education Provided     Education provided:   - HEP    Written Home Exercises Provided: Patient " "instructed to cont prior HEP.  Exercises were reviewed and Lisa was able to demonstrate them prior to the end of the session.  Lisa demonstrated good  understanding of the education provided.     See EMR under Media for exercises provided 9/4/2019.    Assessment     Lisa continues ambulating without AD or boot. Rash is improving. Strength and balance improving. R SLS to 10". Unable to toe walk safely due to weakness. Added SL HR requiring use of toe down contralaterally and some AA. She continues with R ankle stiffness and achilles tightness. Step up improved. Pt stating she feels much better performing program today. Cont to address deficits for improved gt and stair navigation.  Pt prognosis is Good.     Pt will continue to benefit from skilled outpatient physical therapy to address the deficits listed in the problem list box on initial evaluation, provide pt/family education and to maximize pt's level of independence in the home and community environment.     Pt's spiritual, cultural and educational needs considered and pt agreeable to plan of care and goals.    Anticipated barriers to physical therapy: none    Goals:   Short Term Goals: 3 weeks progressing to all  -Pt will demo R ankle AROM DF to -5 deg or better for improved heel strike once out of boot. MET 9/19/19, -5 DEG.  -Pt will demo R ankle AROM PF to 50 deg to assist with heel raise. MET 9/19/19, 55 DEG.  -Pt will walk without boot as advised by MD. MET 9/19/19.     Long Term Goals: 6 weeks progressing to all  -Improve FOTO impairment score to 43% for improved QOL.  -R ankle AROM and strength WFL for return to PLOF.    Plan     Cont current plan for ROM, balance, strength, and gt progression.    Dolly Benítez, PT  "

## 2019-10-01 ENCOUNTER — CLINICAL SUPPORT (OUTPATIENT)
Dept: REHABILITATION | Facility: HOSPITAL | Age: 49
End: 2019-10-01
Payer: COMMERCIAL

## 2019-10-01 DIAGNOSIS — R26.9 GAIT ABNORMALITY: ICD-10-CM

## 2019-10-01 DIAGNOSIS — R29.898 WEAKNESS OF RIGHT FOOT: ICD-10-CM

## 2019-10-01 PROCEDURE — 97112 NEUROMUSCULAR REEDUCATION: CPT | Mod: PO | Performed by: PHYSICAL THERAPIST

## 2019-10-01 PROCEDURE — 97110 THERAPEUTIC EXERCISES: CPT | Mod: PO | Performed by: PHYSICAL THERAPIST

## 2019-10-01 PROCEDURE — 97140 MANUAL THERAPY 1/> REGIONS: CPT | Mod: PO | Performed by: PHYSICAL THERAPIST

## 2019-10-01 NOTE — PROGRESS NOTES
"    Physical Therapy Daily Treatment Note     Name: Lisa Dave [facility] Number: [id number]    Therapy Diagnosis:   Encounter Diagnoses   Name Primary?    Weakness of right foot     Gait abnormality      Physician: Rip Guy MD    Visit Date: 10/1/2019  Physician Orders: PT Eval and Treat   Medical Diagnosis from Referral: M21.41 (ICD-10-CM) - Acquired pes planovalgus of right foot  Evaluation Date: 8/29/2019  Authorization Period Expiration: 12/31/2019  Plan of Care Expiration: 10/9/19  Visit # / Visits authorized: 10/ 20    Time In: 1400  Time Out: 1500  Total Billable Time: 54 minutes    Precautions: Standard    Subjective     Pt reports: stiffness which is when she awakes, relieved with movement, and returns with swelling in the evenings. Continues home program.  She was compliant with home exercise program.  Response to previous treatment: no adverse effect  Functional change: progressing ROM and gt, walking without AD    Pain: 3/10 pre tx and 0/10 post tx  Location: right feet      Objective     Lisa received therapeutic exercises to develop strength, ROM and flexibility for 36 minutes including:    Stationary bike with high seat 8' for ROM of R ankle  Calf stretch 2 ways 2'2'    DL HR 2x10 without HHA  SL HR contralateral toe down with HHAx1 x20  Ankle 4 way rtb 3x10 each  Heel tap 4" with fair knee bend and DF 2x10  Step ups 6" 2x10    Neuromuscular ex x 10 min:  R SLS with HHA 3x30", difficult, 5-10" without UE assist  Tandem stance with rebounder toss 2x10 each  Modified Y balance x15    Lisa received the following manual therapy techniques: Joint mobilizations and Manual traction were applied to the: R foot and ankle for 08 minutes, including:  Talocrural distraction  PROM stretch    Cold pack x00 to R foot and ankle.    Home Exercises Provided and Patient Education Provided     Education provided:   - HEP    Written Home Exercises Provided: Patient instructed to cont prior " "HEP.  Exercises were reviewed and Lisa was able to demonstrate them prior to the end of the session.  Lisa demonstrated good  understanding of the education provided.     See EMR under Media for exercises provided 9/4/2019.    Assessment     Lisa continues ambulating without AD or boot. Strength and balance improving. R SLS to 10". Unable to toe walk safely due to weakness. Added SL HR-use of toe down contralaterally and some AA. She continues with R ankle stiffness and achilles tightness. Step up improved. Pt stating she feels much better performing program today. A small click as if scar tissue was heard with improved flexibility noted. Cont to address deficits for improved gt and stair navigation. Complete FOTO next session. Reassess for MD f/u Tuesday.  Pt prognosis is Good.     Pt will continue to benefit from skilled outpatient physical therapy to address the deficits listed in the problem list box on initial evaluation, provide pt/family education and to maximize pt's level of independence in the home and community environment.     Pt's spiritual, cultural and educational needs considered and pt agreeable to plan of care and goals.    Anticipated barriers to physical therapy: none    Goals:   Short Term Goals: 3 weeks progressing to all  -Pt will demo R ankle AROM DF to -5 deg or better for improved heel strike once out of boot. MET 9/19/19, -5 DEG.  -Pt will demo R ankle AROM PF to 50 deg to assist with heel raise. MET 9/19/19, 55 DEG.  -Pt will walk without boot as advised by MD. MET 9/19/19.     Long Term Goals: 6 weeks progressing to all  -Improve FOTO impairment score to 43% for improved QOL.  -R ankle AROM and strength WFL for return to PLOF.    Plan     Cont current plan for ROM, balance, strength, and gt progression.    Dolly Benítez, PT  "

## 2019-10-03 ENCOUNTER — CLINICAL SUPPORT (OUTPATIENT)
Dept: REHABILITATION | Facility: HOSPITAL | Age: 49
End: 2019-10-03
Payer: COMMERCIAL

## 2019-10-03 DIAGNOSIS — R26.9 GAIT ABNORMALITY: ICD-10-CM

## 2019-10-03 DIAGNOSIS — R29.898 WEAKNESS OF RIGHT FOOT: ICD-10-CM

## 2019-10-03 PROCEDURE — 97140 MANUAL THERAPY 1/> REGIONS: CPT | Mod: PO | Performed by: PHYSICAL THERAPIST

## 2019-10-03 PROCEDURE — 97110 THERAPEUTIC EXERCISES: CPT | Mod: PO | Performed by: PHYSICAL THERAPIST

## 2019-10-03 NOTE — PROGRESS NOTES
"    Physical Therapy Daily Treatment Note     Name: Lisa Dave [facility] Number: [id number]    Therapy Diagnosis:   Encounter Diagnoses   Name Primary?    Weakness of right foot     Gait abnormality      Physician: Rip Guy MD    Visit Date: 10/3/2019  Physician Orders: PT Eval and Treat   Medical Diagnosis from Referral: M21.41 (ICD-10-CM) - Acquired pes planovalgus of right foot  Evaluation Date: 8/29/2019  Authorization Period Expiration: 12/31/2019  Plan of Care Expiration: 10/9/19  Visit # / Visits authorized: 11/ 20    Time In: 1500  Time Out: 1600  Total Billable Time: 30 minutes    Precautions: Standard    Subjective     Pt reports: stiffness which is when she awakes, relieved with movement, and returns with swelling in the evenings. Continues home program. She worked on her feet and has increased swelling noted.  She was compliant with home exercise program.  Response to previous treatment: no adverse effect  Functional change: progressing ROM and gt, walking without AD    Pain: 3/10 pre tx and 0/10 post tx  Location: right feet      Objective     Lisa received therapeutic exercises to develop strength, ROM and flexibility for 35 minutes including:    Stationary bike with high seat 8' for ROM of R ankle  Calf stretch 2 ways 2'2'    DL HR 3x30 without HHA  SL HR contralateral toe down with HHAx1 3x10  Ankle 4 way rtb 3x10 each  Heel tap 4" with fair knee bend and DF 2x10  Step ups 6" 2x10    Neuromuscular ex x 10 min:  R SLS with HHA 3x30", difficult, 5-10" without UE assist  Tandem stance with rebounder toss 2x10 each  Modified Y balance x15    Lisa received the following manual therapy techniques: Joint mobilizations and Manual traction were applied to the: R foot and ankle for 10 minutes, including:  Edema massage in elevation  PROM   A/p talar mobs    Cold pack x00 to R foot and ankle.    Home Exercises Provided and Patient Education Provided     Education provided:   - " "HEP    Written Home Exercises Provided: Patient instructed to cont prior HEP.  Exercises were reviewed and Lisa was able to demonstrate them prior to the end of the session.  Lisa demonstrated good  understanding of the education provided.     See EMR under Media for exercises provided 9/4/2019.    Assessment     Lisa continues ambulating without AD or boot. Antalgic gt continues likely due to continued hypomobility of the foot and ankle as well as edema lingering. Strength and balance improving. R SLS to 10". Unable to toe walk safely due to weakness. Added SL HR-use of toe down contralaterally and some AA. She continues with R ankle stiffness and achilles tightness. Step up improved. Cont to address deficits for improved gt and stair navigation. Complete FOTO next session. Reassess for MD f/u Tuesday.  Pt prognosis is Good.     Pt will continue to benefit from skilled outpatient physical therapy to address the deficits listed in the problem list box on initial evaluation, provide pt/family education and to maximize pt's level of independence in the home and community environment.     Pt's spiritual, cultural and educational needs considered and pt agreeable to plan of care and goals.    Anticipated barriers to physical therapy: none    Goals:   Short Term Goals: 3 weeks progressing to all  -Pt will demo R ankle AROM DF to -5 deg or better for improved heel strike once out of boot. MET 9/19/19, -5 DEG.  -Pt will demo R ankle AROM PF to 50 deg to assist with heel raise. MET 9/19/19, 55 DEG.  -Pt will walk without boot as advised by MD. MET 9/19/19.     Long Term Goals: 6 weeks progressing to all  -Improve FOTO impairment score to 43% for improved QOL.  -R ankle AROM and strength WFL for return to PLOF.    Plan     Cont current plan for ROM, balance, strength, and gt progression.    Dolly Benítez, PT  "

## 2019-10-04 DIAGNOSIS — M21.41 ACQUIRED PES PLANOVALGUS OF RIGHT FOOT: Primary | ICD-10-CM

## 2019-10-08 ENCOUNTER — CLINICAL SUPPORT (OUTPATIENT)
Dept: REHABILITATION | Facility: HOSPITAL | Age: 49
End: 2019-10-08
Payer: COMMERCIAL

## 2019-10-08 ENCOUNTER — HOSPITAL ENCOUNTER (OUTPATIENT)
Dept: RADIOLOGY | Facility: HOSPITAL | Age: 49
Discharge: HOME | End: 2019-10-08
Attending: ORTHOPAEDIC SURGERY
Payer: COMMERCIAL

## 2019-10-08 ENCOUNTER — OFFICE VISIT (OUTPATIENT)
Dept: ORTHOPEDICS | Facility: CLINIC | Age: 49
End: 2019-10-08
Payer: COMMERCIAL

## 2019-10-08 VITALS — HEART RATE: 67 BPM | SYSTOLIC BLOOD PRESSURE: 118 MMHG | DIASTOLIC BLOOD PRESSURE: 58 MMHG

## 2019-10-08 DIAGNOSIS — M21.41 ACQUIRED PES PLANOVALGUS OF RIGHT FOOT: ICD-10-CM

## 2019-10-08 DIAGNOSIS — R26.9 GAIT ABNORMALITY: ICD-10-CM

## 2019-10-08 DIAGNOSIS — M21.41 ACQUIRED PES PLANOVALGUS OF RIGHT FOOT: Primary | ICD-10-CM

## 2019-10-08 DIAGNOSIS — R29.898 WEAKNESS OF RIGHT FOOT: ICD-10-CM

## 2019-10-08 PROCEDURE — 73630 XR FOOT COMPLETE 3 VIEW RIGHT: ICD-10-PCS | Mod: 26,RT,, | Performed by: RADIOLOGY

## 2019-10-08 PROCEDURE — 97112 NEUROMUSCULAR REEDUCATION: CPT | Mod: PO | Performed by: PHYSICAL THERAPIST

## 2019-10-08 PROCEDURE — 73650 XR CALCANEUS 2 VIEW RIGHT: ICD-10-PCS | Mod: 26,59,RT, | Performed by: RADIOLOGY

## 2019-10-08 PROCEDURE — 99999 PR PBB SHADOW E&M-EST. PATIENT-LVL II: ICD-10-PCS | Mod: PBBFAC,,, | Performed by: ORTHOPAEDIC SURGERY

## 2019-10-08 PROCEDURE — 99999 PR PBB SHADOW E&M-EST. PATIENT-LVL II: CPT | Mod: PBBFAC,,, | Performed by: ORTHOPAEDIC SURGERY

## 2019-10-08 PROCEDURE — 73650 X-RAY EXAM OF HEEL: CPT | Mod: 26,59,RT, | Performed by: RADIOLOGY

## 2019-10-08 PROCEDURE — 99214 PR OFFICE/OUTPT VISIT, EST, LEVL IV, 30-39 MIN: ICD-10-PCS | Mod: S$GLB,,, | Performed by: ORTHOPAEDIC SURGERY

## 2019-10-08 PROCEDURE — 73650 X-RAY EXAM OF HEEL: CPT | Mod: TC,PO,RT

## 2019-10-08 PROCEDURE — 73630 X-RAY EXAM OF FOOT: CPT | Mod: TC,PO,RT

## 2019-10-08 PROCEDURE — 73630 X-RAY EXAM OF FOOT: CPT | Mod: 26,RT,, | Performed by: RADIOLOGY

## 2019-10-08 PROCEDURE — 97110 THERAPEUTIC EXERCISES: CPT | Mod: PO | Performed by: PHYSICAL THERAPIST

## 2019-10-08 PROCEDURE — 99214 OFFICE O/P EST MOD 30 MIN: CPT | Mod: S$GLB,,, | Performed by: ORTHOPAEDIC SURGERY

## 2019-10-08 NOTE — PROGRESS NOTES
Outpatient Therapy Updated Plan of Care     Visit Date: 10/8/2019  Name: Lisa Simmons  Clinic Number: [id number]    Therapy Diagnosis:   Encounter Diagnoses   Name Primary?    Weakness of right foot     Gait abnormality      Physician: Rip Guy MD    Physician Orders: PT Eval and Treat   Medical Diagnosis from Referral: M21.41 (ICD-10-CM) - Acquired pes planovalgus of right foot  Evaluation Date: 8/29/2019    Total Visits Received: 12  Cancelled Visits: 0  No Show Visits: 0    Current Certification Period:  08/29/2019 to 10/09/2019  Precautions:  standard  Visits from Evaluation Date:  12  Functional Level Prior to Evaluation:  (I) with walking but with pain    Subjective     Update: see below    Objective     Update: see below    Assessment     Update: see below    Previous Short Term Goals Status:   Short Term Goals: 3 weeks progressing to all  -Pt will demo R ankle AROM DF to -5 deg or better for improved heel strike once out of boot. MET 9/19/19, -5 DEG.  -Pt will demo R ankle AROM PF to 50 deg to assist with heel raise. MET 9/19/19, 55 DEG.  -Pt will walk without boot as advised by MD. MET 9/19/19.  New Short Term Goals Status:   See LTGs  Long Term Goal Status:   continue per initial plan of care.  Reasons for Recertification of Therapy:   Pt continues with gait, ROM, strength, and balance deficits as noted below.    Plan     Updated Certification Period: 10/8/2019 to 12/06/2019  Recommended Treatment Plan: 1-2 times per week for 6 weeks: Aquatic Therapy, Electrical Stimulation IFC, Gait Training, Manual Therapy, Moist Heat/ Ice, Neuromuscular Re-ed, Orthotic Management and Training, Patient Education, Self Care, Therapeutic Activites and Therapeutic Exercise    Dolly Benítez, PT  10/8/2019      I CERTIFY THE NEED FOR THESE SERVICES FURNISHED UNDER THIS PLAN OF TREATMENT AND WHILE UNDER MY CARE    Physician's comments:        Physician's Signature:  ___________________________________________________    Physical Therapy Daily Treatment Note     Name: Lisa Simmons  Clinic Number: [id number]    Therapy Diagnosis:   Encounter Diagnoses   Name Primary?    Weakness of right foot     Gait abnormality      Physician: Rip Guy MD    Visit Date: 10/8/2019  Physician Orders: PT Eval and Treat   Medical Diagnosis from Referral: M21.41 (ICD-10-CM) - Acquired pes planovalgus of right foot  Evaluation Date: 8/29/2019  Authorization Period Expiration: 12/31/2019  Plan of Care Expiration: 10/9/19  Visit # / Visits authorized: 12/ 20    Time In: 1000  Time Out: 1050  Total Billable Time: 33 minutes    Precautions: Standard    Subjective     Pt reports: stiffness which is when she awakes, relieved with movement, and returns with swelling in the evenings. Continues home program. She worked on her feet and has increased swelling noted.  She was compliant with home exercise program.  Response to previous treatment: no adverse effect  Functional change: progressing ROM and gt, walking without AD    Pain: 3/10 pre tx and 0/10 post tx  Location: right feet      Objective   8/29/2019:  Observation: Calf wound 100% healed, heel wound healed, dorsal wound healed, lat foot wound healed, lat heel wound healing with scant yellow soft eschar and min white soft eschar.     Gait: Impaired gait with B axillary crutches and R boot.     Range of Motion: AROM (PROM):     Ankle Left Right   Dorsiflexion +5 deg knee ext, +7 deg knee bent  degrees -15 deg knee ext, -13 deg knee bent degrees   Plantarflexion 55 degrees 40 degrees   Inversion 25 degrees 20 degrees   Eversion 20 degrees -6 (from neutral) degrees      Edema fig 8:  L foot: 47 cm  R foot: 51.5 cm     Strength:     Ankle Left Right   Dorsiflexion 5/5 3-/5   Plantarflexion 5/5 3-/5   Inversion 5/5 3-/5   Eversion 5/5 3-/5      10/08/2019:  Gait: walking (I) in regular shoes without with impaired R heel strike, toe  "off, and with R knee hyper ext noted.  R ankle AROM: (improved)  DF knee flex: -5 deg, knee bent: -3 deg (gastrocs tight)  PF: 45 deg  IV: 22 deg  EV: 14 deg  R ankle edema (fig 8):  51.5 cm (no change since last assess)  Strength:  Ankle Left Right   Dorsiflexion 5/5 4-/5   Plantarflexion 5/5 4/5   Inversion 5/5 4-/5   Eversion 5/5 4/5     iLsa received therapeutic exercises to develop strength, ROM and flexibility for 25 minutes including:    Stationary bike with high seat 8' for ROM of R ankle  Calf stretch 2 ways 2'2'    DL HR 3x30 without HHA  SL HR contralateral toe down with HHAx1 3x10, small ht from floor  Ankle 4 way rtb 3x10 each  Heel tap 4" with fair knee bend and DF 2x10, difficulty with control continues  Step ups 6" 2x10  Step ups 12" with red band at arch x20, some balance trouble    Neuromuscular ex x 08 min:  R SLS with HHA 3x30", difficult HHA prn, 7-12" max hold without UE assist, but with significant shaking noted  Tandem stance with rebounder toss 2x10 each-np  Modified Y balance x15    Home Exercises Provided and Patient Education Provided     Education provided:   - HEP    Written Home Exercises Provided: Patient instructed to cont prior HEP.  Exercises were reviewed and Lisa was able to demonstrate them prior to the end of the session.  Lisa demonstrated good  understanding of the education provided.     See EMR under Media for exercises provided 9/4/2019.    Assessment     Lisa continues ambulating without AD or boot. Antalgic gt continues likely due to continued hypomobility of the foot and ankle as well as edema lingering. Strength and balance improving. R SLS to 7-12". Unable to toe walk safely due to weakness. Added SL HR-use of toe down contralaterally and some AA. She continues with R ankle stiffness and achilles tightness. Step up improved. Cont to address deficits for improved gt and stair navigation. See above objective for additional deficits remaining. Complete FOTO next " session.  Pt prognosis is Good.     Pt will continue to benefit from skilled outpatient physical therapy to address the deficits listed in the problem list box on initial evaluation, provide pt/family education and to maximize pt's level of independence in the home and community environment.     Pt's spiritual, cultural and educational needs considered and pt agreeable to plan of care and goals.    Anticipated barriers to physical therapy: none    Goals:   Short Term Goals: 3 weeks progressing to all  -Pt will demo R ankle AROM DF to -5 deg or better for improved heel strike once out of boot. MET 9/19/19, -5 DEG.  -Pt will demo R ankle AROM PF to 50 deg to assist with heel raise. MET 9/19/19, 55 DEG.  -Pt will walk without boot as advised by MD. MET 9/19/19.     Long Term Goals: 6 weeks progressing to all  -Improve FOTO impairment score to 43% for improved QOL.  -R ankle AROM and strength WFL for return to PLOF.    Plan     Cont current plan for ROM, balance, strength, and gt progression.    Dolly Benítez, PT

## 2019-10-10 ENCOUNTER — CLINICAL SUPPORT (OUTPATIENT)
Dept: REHABILITATION | Facility: HOSPITAL | Age: 49
End: 2019-10-10
Payer: COMMERCIAL

## 2019-10-10 DIAGNOSIS — R26.9 GAIT ABNORMALITY: ICD-10-CM

## 2019-10-10 DIAGNOSIS — R29.898 WEAKNESS OF RIGHT FOOT: ICD-10-CM

## 2019-10-10 PROCEDURE — 97140 MANUAL THERAPY 1/> REGIONS: CPT | Mod: PO | Performed by: PHYSICAL THERAPIST

## 2019-10-10 PROCEDURE — 97110 THERAPEUTIC EXERCISES: CPT | Mod: PO | Performed by: PHYSICAL THERAPIST

## 2019-10-10 PROCEDURE — 97112 NEUROMUSCULAR REEDUCATION: CPT | Mod: PO | Performed by: PHYSICAL THERAPIST

## 2019-10-10 NOTE — PROGRESS NOTES
"  Physical Therapy Daily Treatment Note     Name: Lisa Dave becky  Clinic Number: [id number]    Therapy Diagnosis:   Encounter Diagnoses   Name Primary?    Weakness of right foot     Gait abnormality      Physician: Rip Guy MD    Visit Date: 10/10/2019  Physician Orders: PT Eval and Treat   Medical Diagnosis from Referral: M21.41 (ICD-10-CM) - Acquired pes planovalgus of right foot  Evaluation Date: 8/29/2019  Authorization Period Expiration: 12/31/2019  Plan of Care Expiration: 10/9/19, 10/30/19  Visit # / Visits authorized: 13/ 20    Time In: 1400  Time Out: 1500  Total Billable Time: 52 minutes    Precautions: Standard    Subjective     Pt reports: her follow up with the Dr. Bre tejeda. Both are pleased with her progress and recognize that she has progress still to be made.  She was compliant with home exercise program.  Response to previous treatment: no adverse effect  Functional change: progressing ROM and gt, walking without AD    Pain: 3/10 pre tx and 0/10 post tx  Location: right feet      Objective      10/08/2019:  Gait: walking (I) in regular shoes without with impaired R heel strike, toe off, and with R knee hyper ext noted.  R ankle AROM: (improved)  DF knee flex: -5 deg, knee bent: -3 deg (gastrocs tight)  PF: 45 deg  IV: 22 deg  EV: 14 deg  R ankle edema (fig 8):  51.5 cm (no change since last assess)  Strength:  Ankle Left Right   Dorsiflexion 5/5 4-/5   Plantarflexion 5/5 4/5   Inversion 5/5 4-/5   Eversion 5/5 4/5     Lisa received therapeutic exercises to develop strength, ROM and flexibility for 32 minutes including:    Stationary bike with high seat 8' for ROM of R ankle  Calf stretch 2 ways 2'2'    DL HR 3x30 without HHA  SL HR contralateral toe down with HHAx1 3x10, small ht from floor  Ankle 4 way rtb 3x10 each  Heel tap 6" 2x10, difficulty with control continues, red band used for decreased knee valgus  Step ups 12" with red band at arch x20, some balance " "trouble  Lat stepping 2 laps on the turf red band  Step downs 4" with contralateral LE x20    Neuromuscular ex x 08 min:  R SLS with HHA 3x30", difficult HHA prn, 7-12" max hold without UE assist, but with significant shaking noted  Tandem stance with rebounder toss 2x10 each-np  Modified Y balance x15    Manual therapy x12 min to the R post calf consisting of STM to improve DF.    Home Exercises Provided and Patient Education Provided     Education provided:   - HEP    Written Home Exercises Provided: Patient instructed to cont prior HEP.  Exercises were reviewed and Lisa was able to demonstrate them prior to the end of the session.  Lisa demonstrated good  understanding of the education provided.     See EMR under Media for exercises provided 9/4/2019.    Assessment     Lisa continues ambulating without AD or boot. Antalgic gt continues likely due to continued hypomobility of the foot and ankle as well as edema lingering. Strength and balance improving. R SLS to 7-12". Unable to toe walk safely due to weakness. Even attempted contralateral toe down which also caused R calf pain. Activity was discontinued at this time. SL HR-use of toe down contralaterally and some AA. She continues with R ankle stiffness and achilles tightness. Step up improved. Step down not smooth yet. Cont to address deficits for improved gt and stair navigation. See above objective for additional deficits remaining. Complete FOTO next session, was not able to complete today due to progressions.  Pt prognosis is Good.     Pt will continue to benefit from skilled outpatient physical therapy to address the deficits listed in the problem list box on initial evaluation, provide pt/family education and to maximize pt's level of independence in the home and community environment.     Pt's spiritual, cultural and educational needs considered and pt agreeable to plan of care and goals.    Anticipated barriers to physical therapy: none    Goals: "   Short Term Goals: 3 weeks progressing to all  -Pt will demo R ankle AROM DF to -5 deg or better for improved heel strike once out of boot. MET 9/19/19, -5 DEG.  -Pt will demo R ankle AROM PF to 50 deg to assist with heel raise. MET 9/19/19, 55 DEG.  -Pt will walk without boot as advised by MD. MET 9/19/19.     Long Term Goals: 6 weeks progressing to all  -Improve FOTO impairment score to 43% for improved QOL.  -R ankle AROM and strength WFL for return to PLOF.    Plan     Cont current plan for ROM, balance, strength, and gt progression.    Dolly Benítez, PT

## 2019-10-15 ENCOUNTER — CLINICAL SUPPORT (OUTPATIENT)
Dept: REHABILITATION | Facility: HOSPITAL | Age: 49
End: 2019-10-15
Payer: COMMERCIAL

## 2019-10-15 DIAGNOSIS — R29.898 WEAKNESS OF RIGHT FOOT: ICD-10-CM

## 2019-10-15 DIAGNOSIS — R26.9 GAIT ABNORMALITY: ICD-10-CM

## 2019-10-15 PROCEDURE — 97112 NEUROMUSCULAR REEDUCATION: CPT | Mod: PO | Performed by: PHYSICAL THERAPIST

## 2019-10-15 PROCEDURE — 97140 MANUAL THERAPY 1/> REGIONS: CPT | Mod: PO | Performed by: PHYSICAL THERAPIST

## 2019-10-15 PROCEDURE — 97110 THERAPEUTIC EXERCISES: CPT | Mod: PO | Performed by: PHYSICAL THERAPIST

## 2019-10-15 NOTE — PROGRESS NOTES
"  Physical Therapy Daily Treatment Note     Name: Lisa Dave [facility] Number: [id number]    Therapy Diagnosis:   Encounter Diagnoses   Name Primary?    Weakness of right foot     Gait abnormality      Physician: Rip Guy MD    Visit Date: 10/15/2019  Physician Orders: PT Eval and Treat   Medical Diagnosis from Referral: M21.41 (ICD-10-CM) - Acquired pes planovalgus of right foot  Evaluation Date: 8/29/2019  Authorization Period Expiration: 12/31/2019  Plan of Care Expiration: 10/9/19, 10/30/19  Visit # / Visits authorized: 14/ 20    Time In: 1400  Time Out: 1500  Total Billable Time: 52 minutes    Precautions: Standard    Subjective     Pt reports: continued times of increased swelling not consistent with movement or activity.  She was compliant with home exercise program.  Response to previous treatment: no adverse effect  Functional change: progressing ROM and gt, walking without AD    Pain: 3/10 pre tx and 0/10 post tx  Location: right feet      Objective      10/08/2019:  Gait: walking (I) in regular shoes without with impaired R heel strike, toe off, and with R knee hyper ext noted.  R ankle AROM: (improved)  DF knee flex: -5 deg, knee bent: -3 deg (gastrocs tight)  PF: 45 deg  IV: 22 deg  EV: 14 deg  R ankle edema (fig 8):  51.5 cm (no change since last assess)  Strength:  Ankle Left Right   Dorsiflexion 5/5 4-/5   Plantarflexion 5/5 4/5   Inversion 5/5 4-/5   Eversion 5/5 4/5     Lias received therapeutic exercises to develop strength, ROM and flexibility for 44 minutes including:    Stationary bike with high seat 8' for ROM of R ankle  Calf stretch 2 ways 2'2'    SL HR contralateral toe down with HHAx1 3x10, small ht from floor  Ankle 4 way rtb 3x10 each-np  Heel tap 6" 2x10, difficulty with control continues, red band used for decreased knee valgus  Step ups 12" with red band at arch x20, some balance trouble  Lat stepping 2 laps on the turf red band  Step downs 4" with " "contralateral LE x20    Neuromuscular ex x 08 min:  R SLS with HHA 5x6-12" holds on floor, difficult HHA prn,but with significant shaking noted  Tandem stance with rebounder toss 2x10 each-np  Modified Y balance x20    Manual therapy x08 min to the R post calf consisting of STM to improve DF.    Home Exercises Provided and Patient Education Provided     Education provided:   - HEP    Written Home Exercises Provided: Patient instructed to cont prior HEP.  Exercises were reviewed and Lisa was able to demonstrate them prior to the end of the session.  Lisa demonstrated good  understanding of the education provided.     See EMR under Media for exercises provided 9/4/2019.    Assessment     Lisa continues ambulating without AD or boot. Antalgic gt continues likely due to continued hypomobility of the foot and ankle as well as edema lingering. Calf soreness and tightness continues. Strength and balance improving. R SLS to 7-12". Unable to toe walk safely due to weakness. Even attempted contralateral toe down which also caused R calf pain. Activity was discontinued at this time. SL HR-use of toe down contralaterally and some AA. She continues with R ankle stiffness and achilles tightness. Step up improved. Step down not smooth yet. Cont to address deficits for improved gt and stair navigation. Complete FOTO next session, was not able to complete today due to time.  Pt prognosis is Good.     Pt will continue to benefit from skilled outpatient physical therapy to address the deficits listed in the problem list box on initial evaluation, provide pt/family education and to maximize pt's level of independence in the home and community environment.     Pt's spiritual, cultural and educational needs considered and pt agreeable to plan of care and goals.    Anticipated barriers to physical therapy: none    Goals:   Short Term Goals: 3 weeks progressing to all  -Pt will demo R ankle AROM DF to -5 deg or better for improved " heel strike once out of boot. MET 9/19/19, -5 DEG.  -Pt will demo R ankle AROM PF to 50 deg to assist with heel raise. MET 9/19/19, 55 DEG.  -Pt will walk without boot as advised by MD. MET 9/19/19.     Long Term Goals: 6 weeks progressing to all  -Improve FOTO impairment score to 43% for improved QOL.  -R ankle AROM and strength WFL for return to PLOF.    Plan     Cont current plan for ROM, balance, strength, and gt progression.    Dolly Benítez, PT

## 2019-10-17 ENCOUNTER — CLINICAL SUPPORT (OUTPATIENT)
Dept: REHABILITATION | Facility: HOSPITAL | Age: 49
End: 2019-10-17
Payer: COMMERCIAL

## 2019-10-17 DIAGNOSIS — R26.9 GAIT ABNORMALITY: ICD-10-CM

## 2019-10-17 DIAGNOSIS — R29.898 WEAKNESS OF RIGHT FOOT: ICD-10-CM

## 2019-10-17 PROCEDURE — 97140 MANUAL THERAPY 1/> REGIONS: CPT | Mod: PO | Performed by: PHYSICAL THERAPIST

## 2019-10-17 PROCEDURE — 97116 GAIT TRAINING THERAPY: CPT | Mod: PO | Performed by: PHYSICAL THERAPIST

## 2019-10-17 PROCEDURE — 97110 THERAPEUTIC EXERCISES: CPT | Mod: PO | Performed by: PHYSICAL THERAPIST

## 2019-10-17 NOTE — PROGRESS NOTES
Physical Therapy Daily Treatment Note     Name: Lisa Dave [facility] Number: [id number]    Therapy Diagnosis:   Encounter Diagnoses   Name Primary?    Weakness of right foot     Gait abnormality      Physician: Rip Guy MD    Visit Date: 10/17/2019  Physician Orders: PT Eval and Treat   Medical Diagnosis from Referral: M21.41 (ICD-10-CM) - Acquired pes planovalgus of right foot  Evaluation Date: 8/29/2019  Authorization Period Expiration: 12/31/2019  Plan of Care Expiration: 10/9/19, 10/30/19  Visit # / Visits authorized: 15/ 20    Time In: 1100  Time Out: 1155  Total Billable Time: 50 minutes    Precautions: Standard    Subjective     Pt reports: increased pain following last visit (7/10); reports increased pain during ambulation today as compared to usual tightness in R calf and lateral foot. She has been stretching every day which helps. She indicates increased pain in the morning or after long car rides. Pt reports that she has been doing more walking lately which could be contributing to her increase in pain. Pt reports significant decrease in pain in R lateral ankle along peroneal tendons following STM, which was her main pain complaint prior to session.   She was compliant with home exercise program.  Response to previous treatment: no adverse effect  Functional change: progressing ROM and gt, walking without AD    Pain: 6/10 pre tx and 2/10 post tx  Location: right feet      Objective      10/08/2019:  Gait: walking (I) in regular shoes without with impaired R heel strike, toe off, and with R knee hyper ext noted.  R ankle AROM: (improved)  DF knee flex: -5 deg, knee bent: -3 deg (gastrocs tight)  PF: 45 deg  IV: 22 deg  EV: 14 deg  R ankle edema (fig 8):  51.5 cm (no change since last assess)  Strength:  Ankle Left Right   Dorsiflexion 5/5 4-/5   Plantarflexion 5/5 4/5   Inversion 5/5 4-/5   Eversion 5/5 4/5     Lisa received therapeutic exercises to develop strength, ROM and  "flexibility for 14 minutes including:    Stationary bike with high seat 10' for ROM of R ankle   Calf stretch 2 ways 2'2' (level 3 gastroc, level 2 soleus)     Neuromuscular ex x 4 min:  Modified Y balance x20    Not performed:   R SLS with HHA 5x6-12" holds on floor, difficult HHA prn,but with significant shaking noted  Tandem stance with rebounder toss 2x10 each   SL HR contralateral toe down with HHAx1 3x10, small ht from floor  Ankle 4 way rtb 3x10 each - np  Heel tap 6" 2x10, difficulty with control continues, red band used for decreased knee valgus  Step ups 12" with red band at arch x20, some balance trouble  Lat stepping 2 laps on the turf red band  Step downs 4" with contralateral LE x20    Manual therapy x 31 min to the R post calf consisting of STM and talocrural jt mobs to increase DF ROM and decrease pain. The following were performed:   - STM with TTP release to gastroc, soleus, and peroneals   - posterior talocrural mobs, grade II-III for pain relief and to increase DF ROM.     Gait Training x6 min emphasizing heel strike and toe off on R. Noted increased posterior trunk/hip rotation on R in terminal stance/preswing phase. Decrease R LE stance and WS noted. VC's, TC's, and demonstration provided to correct; pt able to correct with decreased dory.     Home Exercises Provided and Patient Education Provided     Education provided:   - HEP, work on corrected gait at home   - DOMS and appropriate/necessary rest following increase in activity      Written Home Exercises Provided: Patient instructed to cont prior HEP.  Exercises were reviewed and Lisa was able to demonstrate them prior to the end of the session.  Lisa demonstrated good  understanding of the education provided.     See EMR under Media for exercises provided 9/4/2019.    Assessment     Lisa continues ambulating without AD or boot. Antalgic gt continues likely due to continued hypomobility of the foot and ankle; noted decreased heel " strike and toe off and lack of forward weight shifting during ambulation on R. Noted posterior trunk/hip rotation during terminal stance/preswing most likely to compensate for decreased toe off. Decrease R LE stance and WS noted. Gait training provided to facilitate normal gait pattern emphasizing stated deficits. Calf soreness and tightness continues. Due to significant increase in pain, advanced strengthening and balancing exercises were not performed this date. Spent majority of visit working on STM and jt mobs in order to decrease pain, swelling, and increase DF ROM. Pt reports significant improvement in pain following manual therapy. Complete FOTO next session, was not able to complete today due to time.    Pt prognosis is Good.     Pt will continue to benefit from skilled outpatient physical therapy to address the deficits listed in the problem list box on initial evaluation, provide pt/family education and to maximize pt's level of independence in the home and community environment.     Pt's spiritual, cultural and educational needs considered and pt agreeable to plan of care and goals.    Anticipated barriers to physical therapy: none    Goals:   Short Term Goals: 3 weeks progressing to all  -Pt will demo R ankle AROM DF to -5 deg or better for improved heel strike once out of boot. MET 9/19/19, -5 DEG.  -Pt will demo R ankle AROM PF to 50 deg to assist with heel raise. MET 9/19/19, 55 DEG.  -Pt will walk without boot as advised by MD. MET 9/19/19.     Long Term Goals: 6 weeks progressing to all  -Improve FOTO impairment score to 43% for improved QOL.  -R ankle AROM and strength WFL for return to PLOF.    Plan     Cont current plan for ROM, balance, strength, and gt progression.    Dolly Benítez, PT

## 2019-10-18 NOTE — PROGRESS NOTES
Subjective:      Patient ID: Lisa Simmons is a 49 y.o. female.    Chief Complaint: Post-op Evaluation of the Right Foot and Post-op Evaluation (s/p gastroc recession; calc, michelle, & cotton osteotomies 6/26/19)    Doing very well today. She rates her pain as 2/10 today. She is doing well with PT and walking in a tennis shoe.   Social History     Occupational History    Not on file   Tobacco Use    Smoking status: Never Smoker    Smokeless tobacco: Never Used   Substance and Sexual Activity    Alcohol use: Yes     Comment: occasionally    Drug use: Never    Sexual activity: Not on file            Objective:    Ortho Exam   RLE: neurovascularly intact, incisions healing well, No signs of infection, mild dependent edeam. Moves toes well. non-tender to palpation at the osteotomy sites. Very good alignment. Decreased ROM of the ankle and subtalar joints but improving.         XRAYS: 3 views of right foot obtained and reviewed today reveal good correction. Hardware is intact  . There is interval progression of healing.   Assessment:      s/p flat foot reconstruction    Plan:       Continue. PT 2/3.  Gradual return to activities. F/u prn.

## 2019-10-22 ENCOUNTER — CLINICAL SUPPORT (OUTPATIENT)
Dept: REHABILITATION | Facility: HOSPITAL | Age: 49
End: 2019-10-22
Payer: COMMERCIAL

## 2019-10-22 DIAGNOSIS — R26.9 GAIT ABNORMALITY: ICD-10-CM

## 2019-10-22 DIAGNOSIS — R29.898 WEAKNESS OF RIGHT FOOT: ICD-10-CM

## 2019-10-22 PROCEDURE — 97140 MANUAL THERAPY 1/> REGIONS: CPT | Mod: PO

## 2019-10-22 PROCEDURE — 97110 THERAPEUTIC EXERCISES: CPT | Mod: PO

## 2019-10-22 NOTE — PROGRESS NOTES
"  Physical Therapy Daily Treatment Note     Name: Lisa Dave [facility] Number: [id number]    Therapy Diagnosis:   Encounter Diagnoses   Name Primary?    Weakness of right foot     Gait abnormality      Physician: Rip Guy MD    Visit Date: 10/22/2019  Physician Orders: PT Eval and Treat   Medical Diagnosis from Referral: M21.41 (ICD-10-CM) - Acquired pes planovalgus of right foot  Evaluation Date: 8/29/2019  Authorization Period Expiration: 12/31/2019  Plan of Care Expiration: 10/9/19, 10/30/19  Visit # / Visits authorized: 16/ 20    Time In: 1400  Time Out: 1500  Total Billable Time: 55 minutes    Precautions: Standard    Subjective     Pt reports: some decrease in her pain since last visit but still having some pain and now feels like muscle soreness with weightbearing/ambulation now, she has been doing more walking which she attributes to her increase in soreness.   *  She was compliant with home exercise program.  Response to previous treatment: no adverse effect  Functional change: progressing ROM and gt, walking without AD    Pain: 4/10 pre tx and 2/10 post tx  Location: right feet      Objective      Lisa received therapeutic exercises to develop strength, ROM and flexibility for 25 minutes including:    Stationary bike with high seat 10' for ROM of R ankle   Calf stretch 2 ways 2'2' (level 3 gastroc, level 2 soleus)   Self DF strech on 6"step x 10, 5 sec holds  Ankle DF/PF on edge of step x 10  Step ups: x 10 each leg  Ankle isometrics: internal rotation x 10, 5 sec holds  Ankle isometrics, external rotation x 10, 5 sec holds    Not perfomed  R SLS with HHA 5x6-12" holds on floor, difficult HHA prn,but with significant shaking noted  Tandem stance with rebounder toss 2x10 each   SL HR contralateral toe down with HHAx1 3x10, small ht from floor  Ankle 4 way rtb 3x10 each - np  Heel tap 6" 2x10, difficulty with control continues, red band used for decreased knee valgus  Step ups 12" " "with red band at arch x20, some balance trouble  Lat stepping 2 laps on the turf red band  Step downs 4" with contralateral LE x20    Manual therapy x 30 min to the R post calf consisting of STM and talocrural jt mobs to increase DF ROM and decrease pain. The following were performed:   - STM with TTP release to gastroc, soleus, and peroneals   - posterior talocrural mobs, grade II-III for pain relief and to increase DF ROM.  - distal tib-fib jnt: jnt mobs; grade II-III     Home Exercises Provided and Patient Education Provided     Education provided:   - HEP, work on corrected gait at home   - DOMS and appropriate/necessary rest following increase in activity      Written Home Exercises Provided: Patient instructed to cont prior HEP.  Exercises were reviewed and Lisa was able to demonstrate them prior to the end of the session.  Lisa demonstrated good  understanding of the education provided.     See EMR under Media for exercises provided 9/4/2019.    Assessment     Lisa is doing better and does feel better after her in clinic visits with improved gait pattern. She is having expected soreness with increasing her volume of walking but that should hopefully continue to improve with time.     Pt prognosis is Good.     Pt will continue to benefit from skilled outpatient physical therapy to address the deficits listed in the problem list box on initial evaluation, provide pt/family education and to maximize pt's level of independence in the home and community environment.     Pt's spiritual, cultural and educational needs considered and pt agreeable to plan of care and goals.    Anticipated barriers to physical therapy: none    Goals:   Short Term Goals: 3 weeks progressing to all  -Pt will demo R ankle AROM DF to -5 deg or better for improved heel strike once out of boot. MET 9/19/19, -5 DEG.  -Pt will demo R ankle AROM PF to 50 deg to assist with heel raise. MET 9/19/19, 55 DEG.  -Pt will walk without boot as " advised by MD. MET 9/19/19.     Long Term Goals: 6 weeks progressing to all  -Improve FOTO impairment score to 43% for improved QOL.  -R ankle AROM and strength WFL for return to PLOF.    Plan     Cont current plan for ROM, balance, strength, and gt progression.    Michael Garber, PT

## 2019-10-24 ENCOUNTER — CLINICAL SUPPORT (OUTPATIENT)
Dept: REHABILITATION | Facility: HOSPITAL | Age: 49
End: 2019-10-24
Payer: COMMERCIAL

## 2019-10-24 DIAGNOSIS — R29.898 WEAKNESS OF RIGHT FOOT: ICD-10-CM

## 2019-10-24 DIAGNOSIS — R26.9 GAIT ABNORMALITY: ICD-10-CM

## 2019-10-24 PROCEDURE — 97140 MANUAL THERAPY 1/> REGIONS: CPT | Mod: PO

## 2019-10-24 PROCEDURE — 97110 THERAPEUTIC EXERCISES: CPT | Mod: PO

## 2019-10-24 NOTE — PROGRESS NOTES
"  Physical Therapy Daily Treatment Note     Name: Lisa Dave [facility] Number: [id number]    Therapy Diagnosis:   Encounter Diagnoses   Name Primary?    Weakness of right foot     Gait abnormality      Physician: Rip Guy MD    Visit Date: 10/24/2019  Physician Orders: PT Eval and Treat   Medical Diagnosis from Referral: M21.41 (ICD-10-CM) - Acquired pes planovalgus of right foot  Evaluation Date: 8/29/2019  Authorization Period Expiration: 12/31/2019  Plan of Care Expiration: 10/9/19, 10/30/19  Visit # / Visits authorized: 17/ 20    Time In: 1400  Time Out: 1500  Total Billable Time: 55 minutes    Precautions: Standard    Subjective     Pt reports: Continues to improve. Pt reports the pain on the outside of the foot is much better. Pt reports she was very active today.  Pt reports she started driving again yesterday and she felt comfortable.  She was compliant with home exercise program.  Doing exercises twice a day.  Response to previous treatment: better  Functional change: able to drive    Pain: 4/10 pre tx walking, 2/10 standing and 2/10 post tx  Location: right feet      Objective      Lisa received therapeutic exercises to develop strength, ROM and flexibility for 40 minutes including:    Stationary bike with high seat 10' for ROM of R ankle   Calf stretch 2 ways 2'2' (level 3 gastroc, level 2 soleus)   Self DF stretch on 6"step x 10, 5 sec holds  Ankle DF/PF on edge of step x 20  Step ups: x 10 each leg  Step downs 4" with contralateral LE x 10  Ankle 4 way GTB 3x10 each  R SLS with HHA as needed 5x 6-12" holds on floor    Not perfomed  Tandem stance with rebounder toss 2x10 each   SL HR contralateral toe down with HHAx1 3x10, small ht from floor  Heel tap 6" 2x10, difficulty with control continues, red band used for decreased knee valgus  Step ups 12" with red band at arch x20, some balance trouble  Lat stepping 2 laps on the turf red band    Manual therapy x 15 min to the R post " calf consisting of STM and talocrural jt mobs to increase DF ROM and decrease pain. The following were performed:   - STM with TTP release to peroneals, gastroc, and soleus  - posterior talocrural mobs, grade II-III for pain relief and to increase DF ROM.  - distal tib-fib jnt: jnt mobs; grade II-III- NP    Home Exercises Provided and Patient Education Provided     Education provided:   - HEP, work on corrected gait at home   - DOMS and appropriate/necessary rest following increase in activity      Written Home Exercises Provided: Patient instructed to cont prior HEP.  Exercises were reviewed and Lisa was able to demonstrate them prior to the end of the session.  Lisa demonstrated good  understanding of the education provided.     See EMR under Media for exercises provided 9/4/2019.    Assessment     Lisa tolerated treatment well with some increase in soreness following treatment.  Pt able to tolerate exercises added in from previous visits that she had been unable to do recently because of increased pain.  Pt continues with limitations in dorsiflexion ROM and performs compensations with step downs.  Pt reported improvement with step ups today.  Will continue addressing soft tissue and joint restrictions within pain tolerance.  Pt prognosis is Good.     Pt will continue to benefit from skilled outpatient physical therapy to address the deficits listed in the problem list box on initial evaluation, provide pt/family education and to maximize pt's level of independence in the home and community environment.     Pt's spiritual, cultural and educational needs considered and pt agreeable to plan of care and goals.    Anticipated barriers to physical therapy: none    Goals:   Short Term Goals: 3 weeks progressing to all  -Pt will demo R ankle AROM DF to -5 deg or better for improved heel strike once out of boot. MET 9/19/19, -5 DEG.  -Pt will demo R ankle AROM PF to 50 deg to assist with heel raise. MET 9/19/19, 55  DEG.  -Pt will walk without boot as advised by MD. MET 9/19/19.     Long Term Goals: 6 weeks progressing to all  -Improve FOTO impairment score to 43% for improved QOL.  -R ankle AROM and strength WFL for return to PLOF.    Plan     Cont current plan for ROM, balance, strength, and gt progression.    Madisyn Esquivel, PTA

## 2019-10-29 ENCOUNTER — CLINICAL SUPPORT (OUTPATIENT)
Dept: REHABILITATION | Facility: HOSPITAL | Age: 49
End: 2019-10-29
Payer: COMMERCIAL

## 2019-10-29 DIAGNOSIS — R26.9 GAIT ABNORMALITY: ICD-10-CM

## 2019-10-29 DIAGNOSIS — R29.898 WEAKNESS OF RIGHT FOOT: ICD-10-CM

## 2019-10-29 PROCEDURE — 97110 THERAPEUTIC EXERCISES: CPT | Mod: PO | Performed by: PHYSICAL THERAPIST

## 2019-10-29 NOTE — PROGRESS NOTES
"  Physical Therapy Daily Treatment Note     Name: Lisa Dave [facility] Number: [id number]    Therapy Diagnosis:   Encounter Diagnoses   Name Primary?    Weakness of right foot     Gait abnormality      Physician: Rip Guy MD    Visit Date: 10/29/2019  Physician Orders: PT Eval and Treat   Medical Diagnosis from Referral: M21.41 (ICD-10-CM) - Acquired pes planovalgus of right foot  Evaluation Date: 8/29/2019  Authorization Period Expiration: 12/31/2019  Plan of Care Expiration: 10/9/19, 10/30/19  Visit # / Visits authorized: 18/ 20    Time In: 1404  Time Out: 1500  Total Billable Time: 50 minutes    Precautions: Standard    Subjective     Pt reports: foot is doing much better today. She reports that driving has been going pretty good. She states that she feels like she improved a lot since she first started and has been progressing every week with therapy. She is with modified work duties teaching riding lessons.  She was compliant with home exercise program.  Doing exercises twice a day.  Response to previous treatment: better  Functional change: able to drive, work modified    Pain: 2/10 pre tx and 2/10 post tx  Location: right foot      Objective      R ankle ROM:    PF: 45 deg   DF: 2 deg with knee ext, -1 deg with knee bent    Inv: 20 deg   Ev: to neutral     R ankle strength: 5/5 PF, DF, Inv, and Ev (pain on outside of ankle at location of scars with inversion)     FOTO Score: 43%    Lisa received therapeutic exercises to develop strength, ROM and flexibility for 50 minutes including:    Performed and reviewed the following exercises for HEP:   Stationary bike with high seat 10' for ROM of R ankle   Calf stretch 2 ways 2'2' (level 3 gastroc, level 2 soleus)   Ankle 4 way GTB 3x10 each  Deficit heel raise 3x10  Heel raises 3x10   Single leg heel raises   Heel taps 6" step, cont with control difficulty   Step ups: x 10 each leg  Step downs 4" with contralateral LE x 10  SLS 3x30" with " "freq finger taps on table  Lat walks with green band 2x20' ea     Home Exercises Provided and Patient Education Provided     Education provided:   - cont HEP  - cont to focus on corrected gait at home     Written Home Exercises Provided: yes.  Exercises were reviewed and Lisa was able to demonstrate them prior to the end of the session.  Lisa demonstrated good  understanding of the education provided.     See EMR under Media for exercises provided 10/29/19    Assessment     Lisa tolerated treatment well with no adverse effects. She cont to have balance deficits with SLS; she is unable to hold R SLS for >20" without putting fingers down or opposite toe down. Significant improvement in ankle strength and ROM since initial evaluation. Current FOTO score indicates much improved function. Pt is discharged from PT services per MD last orders and has been instructed to continue with HEP focusing on stretching, balance, and functional strengthening.     Goals:   Short Term Goals: 3 weeks progressing to all  -Pt will demo R ankle AROM DF to -5 deg or better for improved heel strike once out of boot. MET 9/19/19, -5 DEG.  -Pt will demo R ankle AROM PF to 50 deg to assist with heel raise. MET 9/19/19, 55 DEG.  -Pt will walk without boot as advised by MD. MET 9/19/19.     Long Term Goals: 6 weeks progressing to all  -Improve FOTO impairment score to 43% for improved QOL, MET 10/29/19.   -R ankle AROM and strength WFL for return to PLOF; partially met 10/29/19, 2 deg DF with knee ext and -1 DF with knee flexed, 45 def PF, 20 deg inv, neutral Ev; 5/5 R ankle strength     Plan     Discharged to home program.    Kari Hampton, SPT    I certify that I was present in the room directing the student in service delivery and guiding them using my skilled judgement. As the co-signing therapist, I have reviewed the students documentation and am responsible for the treatment, assessment, and plan.    Dolly Benítez, PT  "

## 2019-10-30 PROBLEM — R26.9 GAIT ABNORMALITY: Status: RESOLVED | Noted: 2019-08-29 | Resolved: 2019-10-30

## 2019-10-30 PROBLEM — R29.898 WEAKNESS OF RIGHT FOOT: Status: RESOLVED | Noted: 2019-08-29 | Resolved: 2019-10-30

## 2019-10-30 NOTE — PLAN OF CARE
Outpatient Therapy Discharge Summary     Name: [facility] Number: [id number]    Therapy Diagnosis:   Encounter Diagnoses   Name Primary?    Weakness of right foot     Gait abnormality      Physician: Rip Guy MD    Physician Orders: PT Eval and Treat   Medical Diagnosis from Referral: M21.41 (ICD-10-CM) - Acquired pes planovalgus of right foot  Evaluation Date: 8/29/2019    Date of Last visit: 10/29/19  Total Visits Received: 18  Cancelled Visits: 0  No Show Visits: 0    Assessment    Goals:   Short Term Goals: 3 weeks progressing to all  -Pt will demo R ankle AROM DF to -5 deg or better for improved heel strike once out of boot. MET 9/19/19, -5 DEG.  -Pt will demo R ankle AROM PF to 50 deg to assist with heel raise. MET 9/19/19, 55 DEG.  -Pt will walk without boot as advised by MD. MET 9/19/19.     Long Term Goals: 6 weeks progressing to all  -Improve FOTO impairment score to 43% for improved QOL, MET 10/29/19.   -R ankle AROM and strength WFL for return to PLOF; partially met 10/29/19, 2 deg DF with knee ext and -1 DF with knee flexed, 45 def PF, 20 deg inv, neutral Ev; 5/5 R ankle strength     Discharge reason: Pt has completed MD prescription.    Plan   Pt is discharged from skilled PT.    Outpatient Therapy Discharge Summary      Name: [facility] Number: [id number]     Therapy Diagnosis:        Encounter Diagnoses   Name Primary?    Weakness of right foot      Gait abnormality        Physician: Rip Guy MD     Physician Orders: PT Eval and Treat   Medical Diagnosis from Referral: M21.41 (ICD-10-CM) - Acquired pes planovalgus of right foot  Evaluation Date: 8/29/2019     Date of Last visit: 10/29/19  Total Visits Received: 18  Cancelled Visits: 0  No Show Visits: 0     Assessment    Goals:   Short Term Goals: 3 weeks progressing to all  -Pt will demo R ankle AROM DF to -5 deg or better for improved heel strike once out of boot. MET 9/19/19,  -5 DEG.  -Pt will demo R ankle AROM PF to 50 deg to assist with heel raise. MET 9/19/19, 55 DEG.  -Pt will walk without boot as advised by MD. MET 9/19/19.     Long Term Goals: 6 weeks progressing to all  -Improve FOTO impairment score to 43% for improved QOL, MET 10/29/19.   -R ankle AROM and strength WFL for return to PLOF; partially met 10/29/19, 2 deg DF with knee ext and -1 DF with knee flexed, 45 def PF, 20 deg inv, neutral Ev; 5/5 R ankle strength      Discharge reason: MD orders      Plan               Progress Notes   Unsigned         Physical Therapy Daily Treatment Note      Name: Lisa Simmons  Clinic Number: [id number]     Therapy Diagnosis:        Encounter Diagnoses   Name Primary?    Weakness of right foot      Gait abnormality        Physician: Rip Guy MD     Visit Date: 10/29/2019  Physician Orders: PT Eval and Treat   Medical Diagnosis from Referral: M21.41 (ICD-10-CM) - Acquired pes planovalgus of right foot  Evaluation Date: 8/29/2019  Authorization Period Expiration: 12/31/2019  Plan of Care Expiration: 10/9/19, 10/30/19  Visit # / Visits authorized: 18/ 20     Time In: 1404  Time Out: 1500  Total Billable Time: 50 minutes     Precautions: Standard     Subjective      Pt reports: foot is doing much better today. She reports that driving has been going pretty good. She states that she feels like she improved a lot since she first started and has been progressing every week with therapy. She is with modified work duties teaching riding lessons.  She was compliant with home exercise program.  Doing exercises twice a day.  Response to previous treatment: better  Functional change: able to drive, work modified     Pain: 2/10 pre tx and 2/10 post tx  Location: right foot       Objective      R ankle ROM:               PF: 45 deg              DF: 2 deg with knee ext, -1 deg with knee bent               Inv: 20 deg              Ev: to neutral      R ankle strength: 5/5 PF, DF,  "Inv, and Ev (pain on outside of ankle at location of scars with inversion)      FOTO Score: 43%     Lisa received therapeutic exercises to develop strength, ROM and flexibility for 50 minutes including:     Performed and reviewed the following exercises for HEP:   Stationary bike with high seat 10' for ROM of R ankle   Calf stretch 2 ways 2'2' (level 3 gastroc, level 2 soleus)   Ankle 4 way GTB 3x10 each  Deficit heel raise 3x10  Heel raises 3x10   Single leg heel raises   Heel taps 6" step, cont with control difficulty   Step ups: x 10 each leg  Step downs 4" with contralateral LE x 10  SLS 3x30" with freq finger taps on table  Lat walks with green band 2x20' ea      Home Exercises Provided and Patient Education Provided      Education provided:   - cont HEP  - cont to focus on corrected gait at home      Written Home Exercises Provided: yes.  Exercises were reviewed and Lisa was able to demonstrate them prior to the end of the session.  Lisa demonstrated good  understanding of the education provided.      See EMR under Media for exercises provided 10/29/19     Assessment      Lisa tolerated treatment well with no adverse effects. She cont to have balance deficits with SLS; she is unable to hold R SLS for >20" without putting fingers down or opposite toe down. Significant improvement in ankle strength and ROM since initial evaluation. Current FOTO score indicates much improved function. Pt is discharged from PT services per MD last orders and has been instructed to continue with HEP focusing on stretching, balance, and functional strengthening.      Goals:   Short Term Goals: 3 weeks progressing to all  -Pt will demo R ankle AROM DF to -5 deg or better for improved heel strike once out of boot. MET 9/19/19, -5 DEG.  -Pt will demo R ankle AROM PF to 50 deg to assist with heel raise. MET 9/19/19, 55 DEG.  -Pt will walk without boot as advised by MD. MET 9/19/19.     Long Term Goals: 6 weeks progressing to " all  -Improve FOTO impairment score to 43% for improved QOL, MET 10/29/19.   -R ankle AROM and strength WFL for return to PLOF; partially met 10/29/19, 2 deg DF with knee ext and -1 DF with knee flexed, 45 def PF, 20 deg inv, neutral Ev; 5/5 R ankle strength      Plan      Discharged to home program.     Kari Hampton, SPT     I certify that I was present in the room directing the student in service delivery and guiding them using my skilled judgement. As the co-signing therapist, I have reviewed the students documentation and am responsible for the treatment, assessment, and plan.     Dolly Benítez, PT

## 2020-11-25 NOTE — PROGRESS NOTES
Per Dr. Guy's orders to remove sutures. Sutures to right lower extremity were removed without any complications.  Dr. Guy ordered short leg cast to be applied to right lower extremity. Right short leg cast applied to extremity without any problems. Patient tolerated application of cast well. Patient able to move left/right toes freely, cap refill less than 3 seconds. Instructed patient on cast care to include non weight bearing to extremity and elevation of extremity. Cast is not able to get wet. If cast gets wet patient must call the office to come in for cast change. If it is after hours or on the weekend patient to go to ER to have cast removed. Patient to call for an appointment to have cast reapplied. Patient stated understanding.     
Previously Declined (within the last year)

## 2021-05-10 ENCOUNTER — PATIENT MESSAGE (OUTPATIENT)
Dept: RESEARCH | Facility: HOSPITAL | Age: 51
End: 2021-05-10

## (undated) DEVICE — BURR OVAL DMND 4.0MM LONG.

## (undated) DEVICE — UNDERGLOVE BIOGEL PI SZ 6.5 LF

## (undated) DEVICE — SLEEVE SCD EXPRESS CALF MEDIUM

## (undated) DEVICE — Device

## (undated) DEVICE — SUT MONOCRYL 0 CT-1 UND MON

## (undated) DEVICE — GAUZE SPONGE 8X4 12 PLY

## (undated) DEVICE — DRAPE STERI U-SHAPED 47X51IN

## (undated) DEVICE — GLOVE SURGICAL LATEX SZ 6.5

## (undated) DEVICE — DRESSING XEROFORM FOIL PK 1X8

## (undated) DEVICE — DURAPREP SURG SCRUB 26ML

## (undated) DEVICE — PAD CAST SPECIALIST STRL 6

## (undated) DEVICE — PADDING CAST 4IN SPECIALIST

## (undated) DEVICE — IMPLANTABLE DEVICE
Type: IMPLANTABLE DEVICE | Site: FOOT | Status: NON-FUNCTIONAL
Removed: 2019-06-26

## (undated) DEVICE — ALCOHOL 70% ISOP RUBBING 4OZ

## (undated) DEVICE — GAUZE SPONGE 4X4 12PLY

## (undated) DEVICE — PAD CAST SPECIALIST STRL 4

## (undated) DEVICE — BANDAGE ESMARK 6X12

## (undated) DEVICE — DRAPE PLASTIC U 60X72

## (undated) DEVICE — SUT MONOCRYL 3-0 SH U/D

## (undated) DEVICE — SEE MEDLINE ITEM 146313

## (undated) DEVICE — SEE MEDLINE ITEM 152622

## (undated) DEVICE — SUT ETHILON 3-0 PS2 18 BLK

## (undated) DEVICE — SEE MEDLINE ITEM 157117

## (undated) DEVICE — SEE MEDLINE ITEM 146308

## (undated) DEVICE — BLADE SURG #15 CARBON STEEL

## (undated) DEVICE — DRAPE C-ARMOR EQUIPMENT COVER

## (undated) DEVICE — SEE MEDLINE ITEM 146298

## (undated) DEVICE — DRAPE C ARM 42 X 120 10/BX

## (undated) DEVICE — PENCIL ROCKER SWITCH 10FT CORD

## (undated) DEVICE — SEE MEDLINE ITEM 157131

## (undated) DEVICE — ELECTRODE REM PLYHSV RETURN 9

## (undated) DEVICE — SEE MEDLINE ITEM 152529

## (undated) DEVICE — SUT MONOCRYL 2-0 S UND

## (undated) DEVICE — WIRE-K 20MM X 150MM.
Type: IMPLANTABLE DEVICE | Site: FOOT | Status: NON-FUNCTIONAL
Removed: 2019-06-26

## (undated) DEVICE — BLADE MEDIUM 9MM X 25MM

## (undated) DEVICE — NDL 22GA X1 1/2 REG BEVEL